# Patient Record
Sex: MALE | Race: BLACK OR AFRICAN AMERICAN | NOT HISPANIC OR LATINO | Employment: OTHER | URBAN - METROPOLITAN AREA
[De-identification: names, ages, dates, MRNs, and addresses within clinical notes are randomized per-mention and may not be internally consistent; named-entity substitution may affect disease eponyms.]

---

## 2017-03-31 ENCOUNTER — LAB VISIT (OUTPATIENT)
Dept: LAB | Facility: HOSPITAL | Age: 67
End: 2017-03-31
Attending: INTERNAL MEDICINE
Payer: COMMERCIAL

## 2017-03-31 ENCOUNTER — OFFICE VISIT (OUTPATIENT)
Dept: NEPHROLOGY | Facility: CLINIC | Age: 67
End: 2017-03-31
Payer: COMMERCIAL

## 2017-03-31 VITALS
HEIGHT: 71 IN | SYSTOLIC BLOOD PRESSURE: 120 MMHG | BODY MASS INDEX: 24.45 KG/M2 | WEIGHT: 174.63 LBS | DIASTOLIC BLOOD PRESSURE: 80 MMHG | HEART RATE: 73 BPM | OXYGEN SATURATION: 96 %

## 2017-03-31 DIAGNOSIS — N18.2 CHRONIC KIDNEY DISEASE, STAGE II (MILD): ICD-10-CM

## 2017-03-31 DIAGNOSIS — N18.2 CHRONIC KIDNEY DISEASE, STAGE II (MILD): Primary | ICD-10-CM

## 2017-03-31 LAB
BILIRUB UR QL STRIP: NEGATIVE
CLARITY UR REFRACT.AUTO: CLEAR
COLOR UR AUTO: NORMAL
CREAT UR-MCNC: 25 MG/DL
GLUCOSE UR QL STRIP: NEGATIVE
HGB UR QL STRIP: NEGATIVE
KETONES UR QL STRIP: NEGATIVE
LEUKOCYTE ESTERASE UR QL STRIP: NEGATIVE
NITRITE UR QL STRIP: NEGATIVE
PH UR STRIP: 6 [PH] (ref 5–8)
PROT UR QL STRIP: NEGATIVE
PROT UR-MCNC: <7 MG/DL
PROT/CREAT RATIO, UR: NORMAL
SP GR UR STRIP: 1 (ref 1–1.03)
URN SPEC COLLECT METH UR: NORMAL
UROBILINOGEN UR STRIP-ACNC: NEGATIVE EU/DL

## 2017-03-31 PROCEDURE — 1160F RVW MEDS BY RX/DR IN RCRD: CPT | Mod: S$GLB,,, | Performed by: INTERNAL MEDICINE

## 2017-03-31 PROCEDURE — 99999 PR PBB SHADOW E&M-EST. PATIENT-LVL III: CPT | Mod: PBBFAC,,, | Performed by: INTERNAL MEDICINE

## 2017-03-31 PROCEDURE — 1159F MED LIST DOCD IN RCRD: CPT | Mod: S$GLB,,, | Performed by: INTERNAL MEDICINE

## 2017-03-31 PROCEDURE — 1157F ADVNC CARE PLAN IN RCRD: CPT | Mod: S$GLB,,, | Performed by: INTERNAL MEDICINE

## 2017-03-31 PROCEDURE — 99213 OFFICE O/P EST LOW 20 MIN: CPT | Mod: S$GLB,,, | Performed by: INTERNAL MEDICINE

## 2017-03-31 PROCEDURE — 81003 URINALYSIS AUTO W/O SCOPE: CPT

## 2017-03-31 PROCEDURE — 82570 ASSAY OF URINE CREATININE: CPT

## 2017-03-31 PROCEDURE — 1126F AMNT PAIN NOTED NONE PRSNT: CPT | Mod: S$GLB,,, | Performed by: INTERNAL MEDICINE

## 2017-04-02 NOTE — PROGRESS NOTES
Subjective:       Patient ID: Hung Sorto is a 66 y.o. Black or  male who presents for follow-up evaluation of Chronic Kidney Disease    HPI    Mr. Sorto is a 66 year old man with past medical history of chronic kidney disease presenting for follow up of kidney function.  Patient currently without complaints, denies any fever, shortness of breath, abdominal pain, dysuria/hematuria. He reports blood pressure usually 130's systolic.    Review of Systems   Constitutional: Negative for appetite change, fatigue and fever.   Respiratory: Negative for cough and shortness of breath.    Cardiovascular: Negative for chest pain and leg swelling.   Gastrointestinal: Negative for abdominal pain, constipation, diarrhea, nausea and vomiting.   Genitourinary: Negative for dysuria, flank pain, frequency, hematuria and urgency.   Musculoskeletal: Negative for arthralgias, back pain and joint swelling.   Skin: Negative for rash.   Neurological: Negative for dizziness and light-headedness.   All other systems reviewed and are negative.      Objective:      Physical Exam   Constitutional: He appears well-developed and well-nourished.   Cardiovascular: Normal rate and regular rhythm.  Exam reveals no gallop and no friction rub.    No murmur heard.  Pulmonary/Chest: Effort normal and breath sounds normal. No respiratory distress. He has no wheezes. He has no rales.   Musculoskeletal: He exhibits no edema.   Neurological: He is alert.   Skin: Skin is warm and dry. No rash noted.   Vitals reviewed.      Assessment:       1. Chronic kidney disease, stage II (mild)        Plan:       Mr. Sorto is a 66 year old man with past medical history of chronic kidney disease, stage II/III presenting for follow up of kidney function.  Creatinine relatively stable, will continue to trend. Stressed importance of blood pressure control to prevent any progression of kidney disease, patient voiced understanding.   - Hypercalcemia:  previously resolved, patient followed by endocrine    Return to clinic 1 year with renal/heme panel, urinalysis/culture, urine protein/creatinine ratio prior to next visit

## 2017-06-09 ENCOUNTER — HOSPITAL ENCOUNTER (OUTPATIENT)
Dept: RADIOLOGY | Facility: HOSPITAL | Age: 67
Discharge: HOME | End: 2017-06-09
Attending: INTERNAL MEDICINE
Payer: COMMERCIAL

## 2017-06-09 ENCOUNTER — OFFICE VISIT (OUTPATIENT)
Dept: INTERNAL MEDICINE | Facility: CLINIC | Age: 67
End: 2017-06-09
Payer: COMMERCIAL

## 2017-06-09 ENCOUNTER — HOSPITAL ENCOUNTER (OUTPATIENT)
Dept: CARDIOLOGY | Facility: CLINIC | Age: 67
Discharge: HOME | End: 2017-06-09
Payer: COMMERCIAL

## 2017-06-09 VITALS
SYSTOLIC BLOOD PRESSURE: 147 MMHG | DIASTOLIC BLOOD PRESSURE: 86 MMHG | HEART RATE: 70 BPM | HEIGHT: 71 IN | OXYGEN SATURATION: 97 %

## 2017-06-09 DIAGNOSIS — R06.09 DOE (DYSPNEA ON EXERTION): ICD-10-CM

## 2017-06-09 DIAGNOSIS — E11.22 TYPE 2 DIABETES MELLITUS WITH STAGE 3 CHRONIC KIDNEY DISEASE, WITHOUT LONG-TERM CURRENT USE OF INSULIN: ICD-10-CM

## 2017-06-09 DIAGNOSIS — N18.9 CHRONIC KIDNEY DISEASE, UNSPECIFIED STAGE: Primary | ICD-10-CM

## 2017-06-09 DIAGNOSIS — Z72.0 TOBACCO ABUSE: ICD-10-CM

## 2017-06-09 DIAGNOSIS — N18.30 TYPE 2 DIABETES MELLITUS WITH STAGE 3 CHRONIC KIDNEY DISEASE, WITHOUT LONG-TERM CURRENT USE OF INSULIN: ICD-10-CM

## 2017-06-09 DIAGNOSIS — E04.2 MULTINODULAR NON-TOXIC GOITER: ICD-10-CM

## 2017-06-09 LAB
DIASTOLIC DYSFUNCTION: NO
RETIRED EF AND QEF - SEE NOTES: 60 (ref 55–65)

## 2017-06-09 PROCEDURE — 93010 ELECTROCARDIOGRAM REPORT: CPT | Mod: S$GLB,,, | Performed by: INTERNAL MEDICINE

## 2017-06-09 PROCEDURE — 93351 STRESS TTE COMPLETE: CPT | Mod: S$GLB,,, | Performed by: INTERNAL MEDICINE

## 2017-06-09 PROCEDURE — 71020 XR CHEST PA AND LATERAL: CPT | Mod: TC

## 2017-06-09 PROCEDURE — 99214 OFFICE O/P EST MOD 30 MIN: CPT | Mod: S$GLB,,, | Performed by: INTERNAL MEDICINE

## 2017-06-09 PROCEDURE — 3066F NEPHROPATHY DOC TX: CPT | Mod: S$GLB,,, | Performed by: INTERNAL MEDICINE

## 2017-06-09 PROCEDURE — 1159F MED LIST DOCD IN RCRD: CPT | Mod: S$GLB,,, | Performed by: INTERNAL MEDICINE

## 2017-06-09 PROCEDURE — 93321 DOPPLER ECHO F-UP/LMTD STD: CPT | Mod: S$GLB,,, | Performed by: INTERNAL MEDICINE

## 2017-06-09 PROCEDURE — 93005 ELECTROCARDIOGRAM TRACING: CPT | Mod: S$GLB,,, | Performed by: INTERNAL MEDICINE

## 2017-06-09 PROCEDURE — 3044F HG A1C LEVEL LT 7.0%: CPT | Mod: S$GLB,,, | Performed by: INTERNAL MEDICINE

## 2017-06-09 PROCEDURE — 1126F AMNT PAIN NOTED NONE PRSNT: CPT | Mod: S$GLB,,, | Performed by: INTERNAL MEDICINE

## 2017-06-09 PROCEDURE — 71020 XR CHEST PA AND LATERAL: CPT | Mod: 26,,, | Performed by: RADIOLOGY

## 2017-06-09 PROCEDURE — 99999 PR PBB SHADOW E&M-EST. PATIENT-LVL III: CPT | Mod: PBBFAC,,, | Performed by: INTERNAL MEDICINE

## 2017-06-11 ENCOUNTER — TELEPHONE (OUTPATIENT)
Dept: INTERNAL MEDICINE | Facility: CLINIC | Age: 67
End: 2017-06-11

## 2017-06-13 ENCOUNTER — TELEPHONE (OUTPATIENT)
Dept: INTERNAL MEDICINE | Facility: CLINIC | Age: 67
End: 2017-06-13

## 2017-06-13 NOTE — TELEPHONE ENCOUNTER
----- Message from Luda Jung sent at 6/13/2017  3:52 PM CDT -----  Contact: Pt can be reached at   Pt is calling to request test results for echo stress, and xray results. This is pt second request for results.    Thank you!

## 2017-06-14 ENCOUNTER — TELEPHONE (OUTPATIENT)
Dept: ALLERGY | Facility: CLINIC | Age: 67
End: 2017-06-14

## 2017-06-14 NOTE — TELEPHONE ENCOUNTER
----- Message from Justa Agosto sent at 6/14/2017  9:54 AM CDT -----  Contact: Self  Pt is calling requesting the results of his test.  Pt says he's called three times and no one has contacted him yet. Pt says he is still having a problem.    He can be reached at [phone].    Thank you.

## 2017-06-14 NOTE — TELEPHONE ENCOUNTER
Contacted patient and notified him Dr. Gregory will release his results when he returns to clinic....    LC

## 2017-06-19 ENCOUNTER — TELEPHONE (OUTPATIENT)
Dept: INTERNAL MEDICINE | Facility: CLINIC | Age: 67
End: 2017-06-19

## 2017-06-19 NOTE — TELEPHONE ENCOUNTER
----- Message from Deneen Bajwa sent at 6/19/2017  9:24 AM CDT -----  Contact: Self/[phone] cell/[phone] home   Patient would like to get test results.  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 Please call and advise          Thank you

## 2017-06-21 ENCOUNTER — TELEPHONE (OUTPATIENT)
Dept: INTERNAL MEDICINE | Facility: CLINIC | Age: 67
End: 2017-06-21

## 2017-06-21 DIAGNOSIS — R06.09 DOE (DYSPNEA ON EXERTION): Primary | ICD-10-CM

## 2017-06-21 NOTE — TELEPHONE ENCOUNTER
----- Message from Brayan Obando sent at 6/20/2017  1:57 PM CDT -----  Contact: self [phone]  Patient stated he understand is test came back normal , but he is still having breathing problems , request a call back from the office to see next step to take . Please advise , Thanks  !

## 2017-06-22 NOTE — PROGRESS NOTES
HISTORY OF PRESENT ILLNESS:  He is a 67-year-old gentleman coming in for some   chest pain and tightness and shortness of breath.  He had this yesterday   afternoon, it was in low   .  He was working on riding a .  He did   not have any chest pain, just a little grasping sensation in his chest.  He went   home and rested, it resolved.  He is having no shortness of breath or chest   pain now.  He denies any nausea or vomiting associated with it.  No PND or   orthopnea.  It was not extremely hot outside when he had this and he is not   having any other complaints today.  He has a history of insulin resistance,   hypertension and chronic kidney disease.    PHYSICAL EXAMINATION:  GENERAL:  He is a well-appearing 67-year-old -American gentleman in no   acute distress.  NECK:  Supple.  He has no JVD.  Thyroid is not enlarged.  CARDIOVASCULAR:  S1 and S2, regular rate and rhythm without murmur, gallop or   rub.  ABDOMEN:  Soft, nontender, no hepatosplenomegaly, no guarding or rebound   tenderness.  EXTREMITIES:  Lower extremities:  No edema.    I cannot reproduce the chest pain today.  His pulse ox is of 97% on room air.    EKG showed just bradycardia with a rate of 57.  Otherwise, no significant   changes from his previous EKG in January 1999.  We got labs and only had   negative troponin.  Hemoglobin A1c is 6.6, but he has diabetes that is diet   controlled.  He has CKD III with a creatinine clearance of 54 with a creatinine   of 1.5.    ASSESSMENT:  Shortness of breath with little chest tightness, suspicious for   angina.  We are going to get a stress echocardiogram.  We also got chemistry,   CBC, troponin.  He got EKG in room today and we will get a stress echo. If he   has worsening chest pain or worsens, he is going let us know and come in to the   Emergency Room.  I will follow him up as already scheduled.      SHRUTHI  dd: 06/21/2017 22:02:34 (CDT)  td: 06/22/2017 11:58:37 (CDT)  Doc ID   #[id number]   Job ID #[id number]    CC:

## 2017-06-23 ENCOUNTER — OFFICE VISIT (OUTPATIENT)
Dept: CARDIOLOGY | Facility: CLINIC | Age: 67
End: 2017-06-23
Payer: COMMERCIAL

## 2017-06-23 VITALS
OXYGEN SATURATION: 98 % | HEIGHT: 71 IN | SYSTOLIC BLOOD PRESSURE: 168 MMHG | DIASTOLIC BLOOD PRESSURE: 83 MMHG | WEIGHT: 168.63 LBS | BODY MASS INDEX: 23.61 KG/M2 | HEART RATE: 58 BPM

## 2017-06-23 DIAGNOSIS — R03.0 ELEVATED BLOOD PRESSURE READING IN OFFICE WITH WHITE COAT SYNDROME, WITHOUT DIAGNOSIS OF HYPERTENSION: ICD-10-CM

## 2017-06-23 DIAGNOSIS — R06.02 SOB (SHORTNESS OF BREATH): Primary | ICD-10-CM

## 2017-06-23 DIAGNOSIS — N18.30 STAGE 3 CHRONIC KIDNEY DISEASE: ICD-10-CM

## 2017-06-23 PROCEDURE — 1126F AMNT PAIN NOTED NONE PRSNT: CPT | Mod: S$GLB,,, | Performed by: INTERNAL MEDICINE

## 2017-06-23 PROCEDURE — 99203 OFFICE O/P NEW LOW 30 MIN: CPT | Mod: S$GLB,,, | Performed by: INTERNAL MEDICINE

## 2017-06-23 PROCEDURE — 1159F MED LIST DOCD IN RCRD: CPT | Mod: S$GLB,,, | Performed by: INTERNAL MEDICINE

## 2017-06-23 PROCEDURE — 99999 PR PBB SHADOW E&M-EST. PATIENT-LVL III: CPT | Mod: PBBFAC,,, | Performed by: INTERNAL MEDICINE

## 2017-06-23 RX ORDER — ACETAMINOPHEN 500 MG
5000 TABLET ORAL DAILY
COMMUNITY

## 2017-06-23 RX ORDER — ALBUTEROL SULFATE 90 UG/1
2 AEROSOL, METERED RESPIRATORY (INHALATION) EVERY 6 HOURS PRN
Qty: 18 G | Refills: 1 | Status: SHIPPED | OUTPATIENT
Start: 2017-06-23 | End: 2017-08-14 | Stop reason: SDUPTHER

## 2017-06-23 NOTE — LETTER
June 23, 2017      Tacos Gregory Jr., MD  [address]  [place] [address]           [facility]iology  [address]  [place] [address]  Phone: [phone]          Patient: Hung Sorto   MR Number: [id number]   YOB: 1950   Date of Visit: 6/23/2017       Dear Dr. Tacos Gregory Jr.:    Thank you for referring Hung Sorto to me for evaluation. Attached you will find relevant portions of my assessment and plan of care.    If you have questions, please do not hesitate to call me. I look forward to following Hung Sorto along with you.    Sincerely,    Abdulaziz Weller MD    Enclosure  CC:  No Recipients    If you would like to receive this communication electronically, please contact [email or web address] or [phone] to request more information on [employer] Link access.    For providers and/or their staff who would like to refer a patient to [facility], please contact us through our one-stop-shop provider referral line, [facility] , at [phone].    If you feel you have received this communication in error or would no longer like to receive these types of communications, please e-mail [email or web address]

## 2017-06-23 NOTE — PROGRESS NOTES
"Subjective:   Patient ID:  Hung Sorto is a 67 y.o. male who presents for follow-up of Shortness of Breath (with exertion x 2 weeks)    HPI:   Pt is here for SOB.  He has a h/o tobacco abuse, prediabetes and  stage 3 CKD. Recent exercise stress echo was negative for ischemia at 12 METS and 90% MPHR.  Normal diastolic function. ECG is normal.  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 He states his SOB usually occurs while cutting the grass (he has both rider mower and also walks).  He had a similar episode last year and also noted wheezing.  He was prescribed albuterol. He doesn't have any left at this time but he believes the inhaler relieved his sxs.  He denies wheezing over the past month but states that last year when his sxs occurred, he was wheezing.   He denies any exertional chest discomfort, exertional dyspnea, palpitations, TIA's, syncope or presyncope.    Elevated BP in office today however, this was his highest reading on the chart.  Most reading between 120-145/65-80.    Vitals:    06/23/17 0845   BP: (!) 168/83   BP Location: Left arm   Patient Position: Sitting   BP Method: Automatic   Pulse: (!) 58   SpO2: 98%   Weight: 76.5 kg (168 lb 10.4 oz)   Height: 5' 11" (1.803 m)     Body mass index is 23.52 kg/m².  CrCl cannot be calculated (Patient's most recent sCr result is older than the maximum 14 days allowed.).    Lab Results   Component Value Date     06/09/2017    K 4.3 06/09/2017     06/09/2017    CO2 26 06/09/2017    BUN 15 06/09/2017    CREATININE 1.5 (H) 06/09/2017     (H) 06/09/2017    HGBA1C 6.6 (H) 06/09/2017    MG 2.1 07/21/2008    AST 35 06/09/2017    ALT 38 06/09/2017    ALBUMIN 4.0 06/09/2017    PROT 7.7 06/09/2017    BILITOT 1.5 (H) 06/09/2017    WBC 8.01 06/09/2017    HGB 15.3 06/09/2017    HCT 46.1 06/09/2017    MCV 87 06/09/2017     06/09/2017    PSA 0.41 06/25/2012    TSH 1.174 02/25/2014    CHOL 145 05/28/2015    HDL 44 05/28/2015 "    LDLCALC 86.4 05/28/2015    TRIG 73 05/28/2015       Current Outpatient Prescriptions   Medication Sig    ALPHAGAN P 0.1 % Drop 1 drop 3 (three) times daily.     aspirin (ECOTRIN) 81 MG EC tablet Take 81 mg by mouth once daily.      atorvastatin (LIPITOR) 20 MG tablet TAKE 1 TABLET DAILY    cholecalciferol, vitamin D3, (VITAMIN D3) 5,000 unit Tab Take 5,000 Units by mouth once daily.    dorzolamide (TRUSOPT) 2 % ophthalmic solution Place 1 drop into both eyes 3 (three) times daily.     latanoprost 0.005 % ophthalmic solution Place into both eyes once daily.     albuterol 90 mcg/actuation inhaler Inhale 2 puffs into the lungs every 6 (six) hours as needed for Wheezing.     Current Facility-Administered Medications   Medication    albuterol sulfate nebulizer solution 2.5 mg       Review of Systems   Constitution: Negative for decreased appetite, weakness, malaise/fatigue, weight gain and weight loss.   HENT: Negative for headaches.    Eyes: Negative for visual disturbance.   Cardiovascular: Positive for dyspnea on exertion. Negative for chest pain, claudication, irregular heartbeat, orthopnea, palpitations, paroxysmal nocturnal dyspnea and syncope.   Respiratory: Negative for cough, shortness of breath and snoring.    Skin: Negative for rash.   Musculoskeletal: Negative for arthritis, muscle cramps, muscle weakness and myalgias.   Gastrointestinal: Negative for abdominal pain, anorexia, change in bowel habit and nausea.   Genitourinary: Negative for dysuria and frequency.   Neurological: Negative for excessive daytime sleepiness, dizziness, loss of balance and numbness.   Psychiatric/Behavioral: Negative for depression.       Objective:   Physical Exam   Constitutional: He is oriented to person, place, and time. He appears well-developed and well-nourished.   HENT:   Head: Normocephalic and atraumatic.   Eyes: Pupils are equal, round, and reactive to light.   Neck: Normal range of motion. Neck supple.    Cardiovascular: Normal rate, regular rhythm, normal heart sounds, intact distal pulses and normal pulses.  Exam reveals no gallop.    No murmur heard.  Pulmonary/Chest: Effort normal and breath sounds normal.   Abdominal: Soft. Bowel sounds are normal. There is no hepatosplenomegaly. There is no tenderness.   Musculoskeletal: Normal range of motion.   Neurological: He is alert and oriented to person, place, and time.   Skin: Skin is warm and dry.   Psychiatric: He has a normal mood and affect. His speech is normal and behavior is normal. Judgment and thought content normal.     Assessment:     1. SOB (shortness of breath)    2. Stage 3 chronic kidney disease    3. Elevated blood pressure reading in office with white coat syndrome, without diagnosis of hypertension        Plan:   SOB is likely reactive airway disease secondary to environment (grass).  Refill albuterol and refer to pulmonary for further workup given his h/o tobacco use.

## 2017-08-14 ENCOUNTER — HOSPITAL ENCOUNTER (OUTPATIENT)
Dept: PULMONOLOGY | Facility: CLINIC | Age: 67
Discharge: HOME | End: 2017-08-14
Payer: COMMERCIAL

## 2017-08-14 ENCOUNTER — OFFICE VISIT (OUTPATIENT)
Dept: PULMONOLOGY | Facility: CLINIC | Age: 67
End: 2017-08-14
Payer: COMMERCIAL

## 2017-08-14 VITALS
BODY MASS INDEX: 23.67 KG/M2 | HEIGHT: 71 IN | DIASTOLIC BLOOD PRESSURE: 84 MMHG | WEIGHT: 169.06 LBS | SYSTOLIC BLOOD PRESSURE: 132 MMHG | OXYGEN SATURATION: 98 % | HEART RATE: 62 BPM

## 2017-08-14 DIAGNOSIS — R06.02 SHORTNESS OF BREATH: ICD-10-CM

## 2017-08-14 DIAGNOSIS — J44.9 STAGE 1 MILD CHRONIC OBSTRUCTIVE PULMONARY DISEASE BY GLOBAL INITIATIVE FOR CHRONIC OBSTRUCTIVE LUNG DISEASE CLASSIFICATION: ICD-10-CM

## 2017-08-14 DIAGNOSIS — R06.02 SHORTNESS OF BREATH: Primary | ICD-10-CM

## 2017-08-14 DIAGNOSIS — R06.02 SOB (SHORTNESS OF BREATH): ICD-10-CM

## 2017-08-14 LAB
POST FEV1 FVC: 0.68
POST FEV1: 2.77
POST FVC: 4.07
PRE FEV1 FVC: 68
PRE FEV1: 2.74
PRE FVC: 4.04
PREDICTED FEV1 FVC: 78
PREDICTED FEV1: 3.51
PREDICTED FVC: 4.4

## 2017-08-14 PROCEDURE — 99999 PR PBB SHADOW E&M-EST. PATIENT-LVL III: CPT | Mod: PBBFAC,,, | Performed by: INTERNAL MEDICINE

## 2017-08-14 PROCEDURE — 99243 OFF/OP CNSLTJ NEW/EST LOW 30: CPT | Mod: 25,S$GLB,, | Performed by: INTERNAL MEDICINE

## 2017-08-14 PROCEDURE — 94729 DIFFUSING CAPACITY: CPT | Mod: S$GLB,,, | Performed by: INTERNAL MEDICINE

## 2017-08-14 PROCEDURE — 94727 GAS DIL/WSHOT DETER LNG VOL: CPT | Mod: 59,S$GLB,, | Performed by: INTERNAL MEDICINE

## 2017-08-14 PROCEDURE — 94060 EVALUATION OF WHEEZING: CPT | Mod: S$GLB,,, | Performed by: INTERNAL MEDICINE

## 2017-08-14 RX ORDER — TIOTROPIUM BROMIDE 18 UG/1
18 CAPSULE ORAL; RESPIRATORY (INHALATION) DAILY
Qty: 30 CAPSULE | Refills: 1 | Status: SHIPPED | OUTPATIENT
Start: 2017-08-14 | End: 2017-10-09 | Stop reason: SINTOL

## 2017-08-14 RX ORDER — ALBUTEROL SULFATE 90 UG/1
2 AEROSOL, METERED RESPIRATORY (INHALATION) EVERY 6 HOURS PRN
Qty: 18 G | Refills: 1 | Status: SHIPPED | OUTPATIENT
Start: 2017-08-14 | End: 2021-12-13 | Stop reason: SDUPTHER

## 2017-08-14 NOTE — LETTER
August 16, 2017      Abdulaziz Weller MD  [address]  [place] [address]           [facility] Pulmonary [facility]  [address]  [place] [address]  Phone: [phone]          Patient: Hung Sorto   MR Number: [id number]   YOB: 1950   Date of Visit: 8/14/2017       Dear Dr. Abdulaziz Weller:    Thank you for referring Hung Sorto to me for evaluation. Attached you will find relevant portions of my assessment and plan of care.    If you have questions, please do not hesitate to call me. I look forward to following Hung Sorto along with you.    Sincerely,    Melvin Sanders MD    Enclosure  CC:  No Recipients    If you would like to receive this communication electronically, please contact [email or web address] or [phone] to request more information on [employer] Link access.    For providers and/or their staff who would like to refer a patient to [facility], please contact us through our one-stop-shop provider referral line, [facility], at [phone].    If you feel you have received this communication in error or would no longer like to receive these types of communications, please e-mail [email or web address]

## 2017-08-14 NOTE — PROGRESS NOTES
Subjective:       Patient ID: Hung Sorto is a 67 y.o. male.    Chief Complaint: SOB    Mr. Sorto is a 66 y/o gentleman being seen upon referral from PCP to evaluate SOB. He originally had an episode of SOB in May 2017, similar to an episode he had last year where he was treated in the office with an albuterol nebulizer and was sent home with an albuterol inhaler, which he only used once.    In May this year, he had one episode of SOB while mowing the lawn. He had associated wheezing, dry cough, and mild chest tightness. No chest pain. Since then he has had recurrent episodes about every other day- all while cutting grass using a push mower, and it requires him to stop to catch his breath. His symptoms are worse with higher humidity. He has not had symptoms at rest, but his wife thinks he has had wheezing at night. He has used the albuterol inhaler most mornings before going to work, with some improvement in symptoms. He has previously been evaluated by cardiology with a normal exercise stress echo, and they feel his SOB is noncardiac in origin. In the past week he has had sinus pressure on the left with left nasal discharge and hoarseness.    Mr. Sorto quit smoking cigarettes 3 months ago. Previously he had 50 yrs x <1ppd. There is no family history of lung disease. Prior to last year's episode, his only previous lung issue was SOB with an unknown glaucoma medication that was promptly stopped by his ophthalmologist. He does yard work since [employer], but previously worked for [employer] as an  for >40 years.      Past Medical History:   Diagnosis Date    Chronic kidney disease     Colon polyp     removed by colonoscopy    Glaucoma (increased eye pressure)     Goiter     MNG    Insulin resistance     Thyroid disease     Tobacco abuse     Vitamin D deficiency disease      Past Surgical History:   Procedure Laterality Date    COLONOSCOPY N/A 10/28/2016    Procedure: COLONOSCOPY;   Surgeon: Melvin Berg MD;  Location: [facility] ([address] [place] [address]);  Service: Endoscopy;  Laterality: N/A;    COLONOSCOPY W/ BIOPSIES AND POLYPECTOMY       Family History   Problem Relation Age of Onset    Diabetes Mother     Alzheimer's disease Mother     Stroke Father     Hypertension Father      Social History     Social History    Marital status:      Spouse name: N/A    Number of children: N/A    Years of education: N/A     Occupational History    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 Negative for weakness.   HENT: Positive for sinus pressure, voice change and congestion.    Respiratory: Positive for cough, chest tightness, shortness of breath, wheezing and use of rescue inhaler. Negative for sputum production.    Cardiovascular: Negative for chest pain and leg swelling.   Genitourinary: Negative for difficulty urinating and hematuria.   Musculoskeletal: Negative for arthralgias and joint swelling.   Skin: Negative for rash.   Gastrointestinal: Negative for nausea, vomiting, abdominal pain and acid reflux.   Neurological: Negative for dizziness.       Objective:      Physical Exam   Constitutional: He appears well-developed and well-nourished. No distress.   HENT:   Head: Normocephalic.   Mouth/Throat: No oropharyngeal exudate.   Pharyngeal erythema, no purulent nasal discharge in nares.   Cardiovascular: Normal rate, regular rhythm and normal heart sounds.     Pulmonary/Chest: Normal expansion, symmetric chest wall expansion, effort normal and breath sounds normal. No stridor. No respiratory distress. He has no wheezes.   SpO2 98% at rest, dropped to 88% with moderate exertion, without dyspnea or tachypnea   Abdominal: Soft. He exhibits no distension. There is no tenderness.   Musculoskeletal: He exhibits no edema or deformity.   Neurological: He is alert. Gait normal.   Skin: Skin is warm and dry. No rash noted. He is not diaphoretic. No cyanosis. Nails show no clubbing.   Psychiatric: He has a normal mood and affect. His behavior is normal.   Vitals reviewed.    Personal Diagnostic Review  CXR 6/2017, 6/2016 reviewed: significant for flattened diaphragms, suggestive of hyperinflation.  PFTs demonstrating FEV1/FVC 68%, FEV1 2.74L (81% predicted), FVC 4.04L (94% predicted), TLC 6.86 (103% predicted), RV 2.82 (111% predicted), DLCO 19.9 (78% predicted).  Stress echo 6/2017: no ischemic changes, significant hypertensive response. No mention of RV dilation or strain.        Assessment & Plan:       Mr. Sorto is a 66 y/o man with a significant history of tobacco abuse and symptoms suggestive of reactive airway disease without previous diagnosis or treatment. PFTs demonstrate mild obstruction. Exercise-associated desaturations likely due to his obstructive lung disease, which has previously been untreated.       1. Shortness of breath    2. SOB (shortness of breath)    3. Stage 1 mild chronic obstructive pulmonary disease by Global Initiative for Chronic Obstructive Lung Disease classification        -Follow up in clinic in 6 weeks with repeat spirometry and 6MWD testing.  -Starting Tiotropium daily and continue albuterol PRN for wheezing and dyspnea not improving with rest.  -Congratulated him on smoking cessation and encouraged him to continue to avoid cigarettes.  -Encouraged him to stay up to date on pneumonia and flu vaccinations and discussed the importance of avoiding  sick contacts.    Outpatient Encounter Prescriptions as of 8/14/2017   Medication Sig Dispense Refill    albuterol 90 mcg/actuation inhaler Inhale 2 puffs into the lungs every 6 (six) hours as needed for Wheezing. 18 g 1    ALPHAGAN P 0.1 % Drop 1 drop 3 (three) times daily.       aspirin (ECOTRIN) 81 MG EC tablet Take 81 mg by mouth once daily.        atorvastatin (LIPITOR) 20 MG tablet TAKE 1 TABLET DAILY 90 tablet 10    cholecalciferol, vitamin D3, (VITAMIN D3) 5,000 unit Tab Take 5,000 Units by mouth once daily.      dorzolamide (TRUSOPT) 2 % ophthalmic solution Place 1 drop into both eyes 3 (three) times daily.       latanoprost 0.005 % ophthalmic solution Place into both eyes once daily.        Facility-Administered Encounter Medications as of 8/14/2017   Medication Dose Route Frequency Provider Last Rate Last Dose    albuterol sulfate nebulizer solution 2.5 mg  2.5 mg Nebulization 1 time in Clinic/HOD Yuly Justin MD         Orders Placed This Encounter   Procedures    Spirometry with/without bronchodilator     Standing Status:   Future     Number of Occurrences:   1     Standing Expiration Date:   8/14/2018    LUNG VOLUMES     Standing Status:   Future     Number of Occurrences:   1     Standing Expiration Date:   8/14/2018    DLCO-Carbon Monoxide Diffusing Capacity     Standing Status:   Future     Number of Occurrences:   1     Standing Expiration Date:   8/14/2018     Assessment & plan discussed with Dr. Sanders.    Maribel Moser 08/14/2017 6:23 PM  [facility] Fellow

## 2017-08-16 PROBLEM — J44.9: Status: ACTIVE | Noted: 2017-08-16

## 2017-10-02 RX ORDER — ATORVASTATIN CALCIUM 20 MG/1
TABLET, FILM COATED ORAL
Qty: 90 TABLET | Refills: 10 | Status: SHIPPED | OUTPATIENT
Start: 2017-10-02 | End: 2019-02-04 | Stop reason: SDUPTHER

## 2017-10-09 ENCOUNTER — OFFICE VISIT (OUTPATIENT)
Dept: PULMONOLOGY | Facility: CLINIC | Age: 67
End: 2017-10-09
Payer: COMMERCIAL

## 2017-10-09 ENCOUNTER — HOSPITAL ENCOUNTER (OUTPATIENT)
Dept: PULMONOLOGY | Facility: CLINIC | Age: 67
Discharge: HOME | End: 2017-10-09
Payer: COMMERCIAL

## 2017-10-09 VITALS — HEIGHT: 70 IN | BODY MASS INDEX: 25.2 KG/M2 | WEIGHT: 176 LBS

## 2017-10-09 VITALS
HEART RATE: 58 BPM | WEIGHT: 176 LBS | DIASTOLIC BLOOD PRESSURE: 76 MMHG | OXYGEN SATURATION: 99 % | HEIGHT: 70 IN | BODY MASS INDEX: 25.2 KG/M2 | SYSTOLIC BLOOD PRESSURE: 138 MMHG

## 2017-10-09 DIAGNOSIS — R06.02 SHORTNESS OF BREATH: ICD-10-CM

## 2017-10-09 DIAGNOSIS — J44.9 STAGE 2 MODERATE COPD BY GOLD CLASSIFICATION: Primary | ICD-10-CM

## 2017-10-09 LAB
PRE FEV1 FVC: 68
PRE FEV1: 2.57
PRE FVC: 3.76
PREDICTED FEV1 FVC: 79
PREDICTED FEV1: 3.34
PREDICTED FVC: 4.18

## 2017-10-09 PROCEDURE — 99999 PR PBB SHADOW E&M-EST. PATIENT-LVL III: CPT | Mod: PBBFAC,,, | Performed by: INTERNAL MEDICINE

## 2017-10-09 PROCEDURE — 94620 PR PULMONARY STRESS TESTING,SIMPLE: CPT | Mod: S$GLB,,, | Performed by: INTERNAL MEDICINE

## 2017-10-09 PROCEDURE — 99213 OFFICE O/P EST LOW 20 MIN: CPT | Mod: S$GLB,,, | Performed by: INTERNAL MEDICINE

## 2017-10-09 PROCEDURE — 94010 BREATHING CAPACITY TEST: CPT | Mod: S$GLB,,, | Performed by: INTERNAL MEDICINE

## 2017-10-09 NOTE — PROCEDURES
Hung Sorto is a 67 y.o.  male patient, who presents for a 6 minute walk test ordered by Maribel Moser DO.  The diagnosis is COPD.  The patient's BMI is 25.3 kg/m2.  Predicted distance (lower limit of normal) is 373.15 meters.      Test Results:    The test was completed without stopping.  The total time walked was 360 seconds.  During walking, the patient reported:  No complaints.  The patient used no assistive devices during testing.     10/09/2017---------Distance: 365.76 meters (1200 feet)     O2 Sat % Supplemental Oxygen Heart Rate Blood Pressure Yousif Scale   Pre-exercise  (Resting) 98 % Room Air 57 bpm 143/74 mmHg 0   During Exercise 98 % Room Air 59 bpm 149/83 mmHg 0   Post-exercise  (Recovery) 98 % Room Air  58 bpm       Recovery Time:  109 seconds    Performing nurse/tech:  TASHA Brady,      PREVIOUS STUDY:   The patient has not had a previous study.      CLINICAL INTERPRETATION:  Six minute walk distance is 365.76 meters (1200 feet) with no dyspnea.  During exercise, there was no desaturation while breathing room air.  Both blood pressure and heart rate remained stable with walking.  Bradycardia was present prior to exercise.  The patient did not report non-pulmonary symptoms during exercise.  No previous study performed.  Based upon age and body mass index, exercise capacity is less than predicted.

## 2017-10-09 NOTE — PROGRESS NOTES
Subjective:       Patient ID: Hung Sorto is a 67 y.o. male.    Chief Complaint: 2-month follow- up    HPI:  Mr. Sorto is a 68 y/o gentleman here for 2-month follow- up of COPD. At the last visit he was prescribed albuterol and Spiriva. He tried Spiriva for 1 week, but noticed that he was more dyspneic and required more albuterol while on it and he developed hives, at which point he stopped taking it. Most mornings when he works, he takes 2 puffs of albuterol and has good symptom control all day. 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 He is overall feeling better overall since August- no wheezing or SOB. The days he is not planning on being as physically active, he does not use albuterol and does not need it during the day. He can still cut grass for 5 yards in one day, and his SOB is not severe. He has noticed he has had sinus symptoms and nasal congestion for the past 3-4 weeks and attributes this to seasonal allergies- fall & spring. He denies post-nasal drip. He thinks there could be some correlateion with more nasal symptoms with increased respiratory symptoms.     He received his flu shot today. UTD on pneumonia vaccines (2013, 2015).    Review of Systems   Constitutional: Negative for fever and weight loss.   HENT: Positive for rhinorrhea and voice change. Negative for postnasal drip, sore throat and trouble swallowing.    Respiratory: Negative for cough, sputum production, shortness of breath, wheezing and asthma nighttime symptoms.    Cardiovascular: Negative for chest pain.   Skin: Negative for rash.   Gastrointestinal: Negative for nausea, vomiting and acid reflux.       Objective:      Physical Exam   Constitutional: He is oriented to person, place, and time. He appears well-developed. He appears not cachectic. No distress. He is not obese.   HENT:   Head: Normocephalic.   Neck: Neck supple.   Cardiovascular: Normal rate, regular rhythm  and normal heart sounds.    Pulmonary/Chest: Normal expansion, effort normal and breath sounds normal. No stridor. He has no wheezes. He has no rhonchi.   Abdominal: He exhibits no distension.   Lymphadenopathy: No supraclavicular adenopathy is present.     He has no cervical adenopathy.   Neurological: He is alert and oriented to person, place, and time. Gait normal.   Skin: Skin is warm and dry. No rash noted.   Vitals reviewed.    Personal Diagnostic Review  PFTs 10/9: FEV1/FVC= 68%, FVC 3.76L (89% predicted), FEV1 2.57 (77% predicted)- essentially unchanged from August   6MWT 10/9: SpO2 remained 98% throughout without signigicant increase in HR or BP, ambulated 366 of the expected 555 meters for matched controls.    Pulmonary Function Tests 10/9/2017   Ordering Provider DO Ehsan.   Performing nurse/tech/RT TASHA Brady,   Diagnosis Exertional Dyspnea   Height 70   Weight 2816   BMI (Calculated) 25.3   Patient Race    6MWT Status completed without stopping   Patient Reported No complaints   Was O2 used? No   6MW Distance walked (feet) 1200   Distance walked (meters) 365.76   Did patient stop? No   Type of assistive device(s) used? no assistive devices   Oxygen Saturation 98   Supplemental Oxygen Room Air   Heart Rate 57   Blood Pressure 143/74   Yousif Dyspnea Rating  nothing at all   Oxygen Saturation 98   Supplemental Oxygen Room Air   Heart Rate 59   Blood Pressure 149/83   Yousif Dyspnea Rating  nothing at all   Recovery Time (seconds) 109   Oxygen Saturation 98   Supplemental Oxygen Room Air   Heart Rate 58   Is procedure ready for interpretation? Yes   Some recent data might be hidden         Assessment:       Mr. Sorto is a 66y/o gentleman with moderate COPD based on spirometry who is not significantly restricted by his symptoms. He has achieved fairly good symptom control with his IGOR and was intolerant to Spiriva due to medication side effect. He has historical features that suggest he may  have more of an asthma phenotype to his COPD.    1. Stage 2 moderate COPD by GOLD classification        Outpatient Encounter Prescriptions as of 10/9/2017   Medication Sig Dispense Refill    albuterol 90 mcg/actuation inhaler Inhale 2 puffs into the lungs every 6 (six) hours as needed for Wheezing. 18 g 1    ALPHAGAN P 0.1 % Drop 1 drop 3 (three) times daily.       aspirin (ECOTRIN) 81 MG EC tablet Take 81 mg by mouth once daily.        atorvastatin (LIPITOR) 20 MG tablet TAKE 1 TABLET DAILY 90 tablet 10    cholecalciferol, vitamin D3, (VITAMIN D3) 5,000 unit Tab Take 5,000 Units by mouth once daily.      dorzolamide (TRUSOPT) 2 % ophthalmic solution Place 1 drop into both eyes 3 (three) times daily.       latanoprost 0.005 % ophthalmic solution Place into both eyes once daily.       [DISCONTINUED] tiotropium (SPIRIVA WITH HANDIHALER) 18 mcg inhalation capsule Inhale 1 capsule (18 mcg total) into the lungs once daily. Controller 30 capsule 1     Facility-Administered Encounter Medications as of 10/9/2017   Medication Dose Route Frequency Provider Last Rate Last Dose    albuterol sulfate nebulizer solution 2.5 mg  2.5 mg Nebulization 1 time in Clinic/HOD Yuly Justin MD         Orders Placed This Encounter   Procedures    Spirometry without Bronchodilator     Standing Status:   Future     Standing Expiration Date:   10/9/2018     Plan:       Moderate COPD by GOLD; appears to possibly have an asthma phenotype, although no significant bronchodilator response on 8/2017 PFTs.  -Con't IGOR daily prior to activity plus PRN. If symptoms increase or he begins to require the IGOR more frequently, restarting a longer-acting controlling medication would be needed. He will let us know if this occurs. Favor starting LABA-ICS combination at a low-dose if he requires step-up therapy.   -Follow up in 6 months with repeat spirometry or PRN for symptom progression.  -UTD on vaccinations    Former tobacco  abuse  -congratulated him again on quitting smoking last spring    Seen & discussed with Dr. Sanders.    Maribel Moser 10/09/2017 8:21 PM  [employer] [facility] Fellow

## 2018-02-27 ENCOUNTER — TELEPHONE (OUTPATIENT)
Dept: NEPHROLOGY | Facility: CLINIC | Age: 68
End: 2018-02-27

## 2018-02-27 NOTE — TELEPHONE ENCOUNTER
----- Message from Beata Jolanta sent at 2/26/2018  1:48 PM CST -----  Contact: Hung tel:   [phone]  or   [phone] cell   Pt.says he needs an early morning on a Monday for a recall appt. With you.    Needs a March.      Pls call.     Called l/m

## 2018-04-02 ENCOUNTER — HOSPITAL ENCOUNTER (OUTPATIENT)
Dept: PULMONOLOGY | Facility: CLINIC | Age: 68
Discharge: HOME | End: 2018-04-02
Payer: MEDICARE

## 2018-04-02 ENCOUNTER — OFFICE VISIT (OUTPATIENT)
Dept: PULMONOLOGY | Facility: CLINIC | Age: 68
End: 2018-04-02
Payer: MEDICARE

## 2018-04-02 VITALS — HEART RATE: 68 BPM | BODY MASS INDEX: 26.63 KG/M2 | WEIGHT: 186 LBS | OXYGEN SATURATION: 97 % | HEIGHT: 70 IN

## 2018-04-02 DIAGNOSIS — J44.9 STAGE 2 MODERATE COPD BY GOLD CLASSIFICATION: Primary | ICD-10-CM

## 2018-04-02 DIAGNOSIS — J44.9 STAGE 2 MODERATE COPD BY GOLD CLASSIFICATION: ICD-10-CM

## 2018-04-02 LAB
PRE FEV1 FVC: 70
PRE FEV1: 2.55
PRE FVC: 3.66
PREDICTED FEV1 FVC: 79
PREDICTED FEV1: 3.34
PREDICTED FVC: 4.18

## 2018-04-02 PROCEDURE — 94010 BREATHING CAPACITY TEST: CPT | Mod: 26,S$PBB,, | Performed by: INTERNAL MEDICINE

## 2018-04-02 PROCEDURE — 99213 OFFICE O/P EST LOW 20 MIN: CPT | Mod: 25,S$PBB,, | Performed by: INTERNAL MEDICINE

## 2018-04-02 PROCEDURE — 94010 BREATHING CAPACITY TEST: CPT | Mod: PBBFAC | Performed by: INTERNAL MEDICINE

## 2018-04-02 PROCEDURE — 99213 OFFICE O/P EST LOW 20 MIN: CPT | Mod: PBBFAC,25 | Performed by: INTERNAL MEDICINE

## 2018-04-02 PROCEDURE — 99999 PR PBB SHADOW E&M-EST. PATIENT-LVL III: CPT | Mod: PBBFAC,,, | Performed by: INTERNAL MEDICINE

## 2018-04-02 NOTE — PROGRESS NOTES
"Subjective:       Patient ID: Hung Sorto is a 67 y.o. male.    Chief Complaint: COPD and Shortness of Breath    HPI     Mr. Sorto returns for interval assessment of COPD secondary to longstanding smoking.  He has ~50 pack-year smoking history until stopping in May 2017.  Since his last Pulmonary visit in October 2017, he has done very well from respiratory standpoint.  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  He denies significant cough, sputum production, or shortness of breath.  He reports occasional runny nose when there is lots of pollen in the air.  He has been compliant with preventive immunizations and denies having the flu this past winter despite the worse than usual season.    Review of Systems   Constitutional: Negative for activity change, appetite change, fatigue and weakness.   HENT: Positive for rhinorrhea. Negative for postnasal drip and sore throat.    Respiratory: Positive for use of rescue inhaler. Negative for cough, hemoptysis, sputum production, chest tightness, shortness of breath, wheezing, asthma nighttime symptoms and dyspnea on extertion.    Cardiovascular: Negative for chest pain.       Objective:      Physical Exam   Constitutional: He is oriented to person, place, and time. He appears well-nourished. No distress.   HENT:   Right Ear: External ear normal.   Left Ear: External ear normal.   Nose: Nose normal. No mucosal edema. No polyps.   Mouth/Throat: Oropharynx is clear and moist. Normal dentition. No oropharyngeal exudate.   Neck: No JVD present. No thyromegaly present.   Cardiovascular: Normal rate, regular rhythm and normal heart sounds.  Exam reveals no gallop.    No murmur heard.  Pulmonary/Chest: Normal expansion, symmetric chest wall expansion, effort normal and breath sounds normal. No respiratory " distress. He has no decreased breath sounds. He has no wheezes. He has no rhonchi. He has no rales.   Musculoskeletal: He exhibits no edema.   Lymphadenopathy: No supraclavicular adenopathy is present.     He has no cervical adenopathy.   Neurological: He is alert and oriented to person, place, and time. Gait normal.   Skin: Nails show no clubbing.   Psychiatric: He has a normal mood and affect. Judgment normal.   Nursing note and vitals reviewed.    Personal Diagnostic Review    PFTs 08/14/2017 10/09/2017 04/02/2018   FVC  (pre-BD) 4.04 liters 3.76 liters 3.66 liters   FVC%  92 90 87   FEV1 (pre-BD) 2.74 liters 2.57 liters 2.55 liters   FEV1%  78 77 76   FEV1/FVC  68 68 70   FEF 25-75  1.49 1.41 1.55   FEF 25-75%  43 43 47   FVC (post-BD) 4.07 liters     FVC% 93     FEV1 (post-BD) 2.77 liters     FEV1% 79     FEV1/FVC 68     FEF 25-75 1.62     FEF 25-75% 47     TLC  6.86 liters     TLC%  101     RV  2.82 liters     RV%  109     DLCO  19.90 mL/mmHg/min     DLCO%  78     VA 5.99 liters     6MWT  10/09/2017    Distance  366 meters    SpO2 (axel)  98 %    SpO2 (delta)  0 %      Pulmonary Function Tests 4/2/2018   SpO2 97   Height 70.000   Weight 2976   BMI (Calculated) 26.7   Some recent data might be hidden         Assessment:       1. Stage 2 moderate COPD by GOLD classification        Outpatient Encounter Prescriptions as of 4/2/2018   Medication Sig Dispense Refill    albuterol 90 mcg/actuation inhaler Inhale 2 puffs into the lungs every 6 (six) hours as needed for Wheezing. 18 g 1    ALPHAGAN P 0.1 % Drop 1 drop 3 (three) times daily.       aspirin (ECOTRIN) 81 MG EC tablet Take 81 mg by mouth once daily.        atorvastatin (LIPITOR) 20 MG tablet TAKE 1 TABLET DAILY 90 tablet 10    cholecalciferol, vitamin D3, (VITAMIN D3) 5,000 unit Tab Take 5,000 Units by mouth once daily.      dorzolamide (TRUSOPT) 2 % ophthalmic solution Place 1 drop into both eyes 3 (three) times daily.       latanoprost 0.005 %  ophthalmic solution Place into both eyes once daily.        Facility-Administered Encounter Medications as of 4/2/2018   Medication Dose Route Frequency Provider Last Rate Last Dose    albuterol sulfate nebulizer solution 2.5 mg  2.5 mg Nebulization 1 time in Clinic/HOD Yuly Justin MD         No orders of the defined types were placed in this encounter.    Plan:   Mr. Sorto remains stable from the respiratory standpoint with no evidence of worsened COPD.  He remains minimally symptomatic and only uses rescue inhaler occasionally.  He remains a non-smoker and is up-to-date on his immunizations.    I recommend the following:  · Continue rescue albuterol inhaler as needed.  If he notes persistent/consistent inhaler use, he will contact the office so we can consider a step-up in his regimen.  · Nasal steroids spray (nasal fluticasone) over-the-counter as needed for likely allergic rhinitis.  · Return to Pulmonary Clinic annually if stable.  Given his past smoking history, he is an appropriate cancer for low dose chest CT scan for cancer screening.  (We did not discuss CT scan at this visit.)    I have copied Dr. Gregory (Primary Care) and Dr. Weller (Cardiology) on this note.    Melvin Sanders MD  Pulmonary/Critical Care Medicine

## 2018-11-10 ENCOUNTER — HOSPITAL ENCOUNTER (EMERGENCY)
Facility: HOSPITAL | Age: 68
Discharge: HOME | End: 2018-11-10
Attending: EMERGENCY MEDICINE
Payer: MEDICARE

## 2018-11-10 VITALS
SYSTOLIC BLOOD PRESSURE: 142 MMHG | OXYGEN SATURATION: 100 % | RESPIRATION RATE: 20 BRPM | DIASTOLIC BLOOD PRESSURE: 70 MMHG | HEART RATE: 81 BPM | TEMPERATURE: 99 F

## 2018-11-10 DIAGNOSIS — R55 SYNCOPE: ICD-10-CM

## 2018-11-10 DIAGNOSIS — R19.7 DIARRHEA, UNSPECIFIED TYPE: Primary | ICD-10-CM

## 2018-11-10 LAB
ALBUMIN SERPL BCP-MCNC: 4 G/DL
ALP SERPL-CCNC: 62 U/L
ALT SERPL W/O P-5'-P-CCNC: 41 U/L
ANION GAP SERPL CALC-SCNC: 11 MMOL/L
AST SERPL-CCNC: 28 U/L
BASOPHILS # BLD AUTO: 0.01 K/UL
BASOPHILS NFR BLD: 0.1 %
BILIRUB SERPL-MCNC: 1.7 MG/DL
BUN SERPL-MCNC: 21 MG/DL
CALCIUM SERPL-MCNC: 10 MG/DL
CHLORIDE SERPL-SCNC: 107 MMOL/L
CO2 SERPL-SCNC: 22 MMOL/L
CREAT SERPL-MCNC: 1.6 MG/DL
DIFFERENTIAL METHOD: ABNORMAL
EOSINOPHIL # BLD AUTO: 0 K/UL
EOSINOPHIL NFR BLD: 0.4 %
ERYTHROCYTE [DISTWIDTH] IN BLOOD BY AUTOMATED COUNT: 14.8 %
EST. GFR  (AFRICAN AMERICAN): 50.1 ML/MIN/1.73 M^2
EST. GFR  (NON AFRICAN AMERICAN): 43.3 ML/MIN/1.73 M^2
GLUCOSE SERPL-MCNC: 169 MG/DL
HCT VFR BLD AUTO: 48.5 %
HGB BLD-MCNC: 15.2 G/DL
IMM GRANULOCYTES # BLD AUTO: 0.02 K/UL
IMM GRANULOCYTES NFR BLD AUTO: 0.2 %
LYMPHOCYTES # BLD AUTO: 0.4 K/UL
LYMPHOCYTES NFR BLD: 4.2 %
MCH RBC QN AUTO: 28.5 PG
MCHC RBC AUTO-ENTMCNC: 31.3 G/DL
MCV RBC AUTO: 91 FL
MONOCYTES # BLD AUTO: 0.5 K/UL
MONOCYTES NFR BLD: 5.5 %
NEUTROPHILS # BLD AUTO: 7.5 K/UL
NEUTROPHILS NFR BLD: 89.6 %
NRBC BLD-RTO: 0 /100 WBC
PLATELET # BLD AUTO: 208 K/UL
PMV BLD AUTO: 9.8 FL
POTASSIUM SERPL-SCNC: 4 MMOL/L
PROT SERPL-MCNC: 7.7 G/DL
RBC # BLD AUTO: 5.33 M/UL
SODIUM SERPL-SCNC: 140 MMOL/L
TROPONIN I SERPL DL<=0.01 NG/ML-MCNC: <0.006 NG/ML
WBC # BLD AUTO: 8.37 K/UL

## 2018-11-10 PROCEDURE — 25000003 PHARM REV CODE 250: Performed by: ANESTHESIOLOGY

## 2018-11-10 PROCEDURE — 84484 ASSAY OF TROPONIN QUANT: CPT

## 2018-11-10 PROCEDURE — 93010 ELECTROCARDIOGRAM REPORT: CPT | Mod: ,,, | Performed by: INTERNAL MEDICINE

## 2018-11-10 PROCEDURE — 96360 HYDRATION IV INFUSION INIT: CPT

## 2018-11-10 PROCEDURE — 99284 EMERGENCY DEPT VISIT MOD MDM: CPT | Mod: ,,, | Performed by: EMERGENCY MEDICINE

## 2018-11-10 PROCEDURE — 93005 ELECTROCARDIOGRAM TRACING: CPT

## 2018-11-10 PROCEDURE — 80053 COMPREHEN METABOLIC PANEL: CPT

## 2018-11-10 PROCEDURE — 99284 EMERGENCY DEPT VISIT MOD MDM: CPT | Mod: 25

## 2018-11-10 PROCEDURE — 85025 COMPLETE CBC W/AUTO DIFF WBC: CPT

## 2018-11-10 RX ADMIN — SODIUM CHLORIDE 1000 ML: 0.9 INJECTION, SOLUTION INTRAVENOUS at 06:11

## 2018-11-10 NOTE — ED TRIAGE NOTES
Pt had an episode of syncope while sitting a table having a meal. Pt's eyes rolled back in his head and he lost consciousness for approximately 1-2 minutes. Pt is GCS 15 upon arrival. No trauma noted. Pt also reports 3 episodes of diarrhea that began this morning.

## 2018-11-10 NOTE — ED PROVIDER NOTES
"Encounter Date: 11/10/2018    SCRIBE #1 NOTE: I, Maggie Gerardo, am scribing for, and in the presence of,  Dr. Rice. I have scribed the following portions of the note - the Resident attestation.       History     Chief Complaint   Patient presents with    Loss of Consciousness     HPI   68M with PMHx of CKD III, COPD and HLD who [place] [facility] [place] after a syncopal episode at home. Patient states he has had an upset stomach for the last several days and has has diarrhea and not been eating much for the last 2 days. He went to a  today for his mother in law and was at home after sitting down with family when he passed out. He states he slumped over in his chair and does not remember what happened but when he "came to" he was sweating. He states after feeling somewhat better he went to the restroom and had loose bowel movements. He denies any chest pain or SOB associated with the episode.       Review of patient's allergies indicates:  No Known Allergies  Past Medical History:   Diagnosis Date    Chronic kidney disease     Colon polyp     removed by colonoscopy    Glaucoma (increased eye pressure)     Goiter     MNG    Insulin resistance     Thyroid disease     Tobacco abuse     Vitamin D deficiency disease      Past Surgical History:   Procedure Laterality Date    COLONOSCOPY N/A 10/28/2016    Procedure: COLONOSCOPY;  Surgeon: Melvin Berg MD;  Location: [address]);  Service: Endoscopy;  Laterality: N/A;    COLONOSCOPY N/A 10/28/2016    Performed by Melvin Berg MD at [facility] ([address] [place] [address])    COLONOSCOPY W/ BIOPSIES AND POLYPECTOMY       Family History   Problem Relation Age of Onset    Diabetes Mother     Alzheimer's disease Mother     Stroke Father     Hypertension Father      Social History     Tobacco Use    Smoking status: Former Smoker     Packs/day: 0.50     Years: 50.00     Pack years: 25.00     Types: Cigarettes     Last attempt to quit: 2017     Years since " quittin.5    Smokeless tobacco: Never Used   Substance Use Topics    Alcohol use: Yes     Alcohol/week: 0.6 oz     Types: 1 Glasses of wine per week     Comment: occasional    Drug use: No     Review of Systems   Constitutional: Denies weight loss, chills; + episode of weakness and syncope  Eyes: denies vision changes, loss or blurry vision.   ENT: Denies dysphagia, nasal discharge, ear pain or discharge.  Cardiovascular: Denies chest pain, palpitations, orthopnea, or claudication.  Respiratory: Denies cough, hemoptysis and wheezing.  GI: +several day hx of loss of appetite and diarrhea   : Denies dysuria, incontinence, or hematuria.  Musculoskeletal: Denies joint pain or myalgias.  Skin/breast: Denies rashes, lumps, lesions, or discharge.  Neurologic: Denies headache, vertigo, or paresthesias.  Psychiatric: denies suicidal ideation and changes in mood. Denies depression   Endocrine: Denies polyuria, polydipsia, heat/cold intolerance.  Hematologic/Lymph: Denies lymphadenopathy, easy bruising or easy bleeding.  Extremities: denies claudication; denies edema   Allergic/Immunologic: Denies rash, rhinitis.      Physical Exam     Initial Vitals [11/10/18 1727]   BP Pulse Resp Temp SpO2   128/82 92 20 99.3 °F (37.4 °C) 99 %      MAP       --         Physical Exam    Physical Exam:  General: alert, oriented and appears stated age  HENT: NC/AT.Conjunctivae/corneas clear. PERRL, EOMI. Nares normal. Septum midline. Mucosa normal. No drainage or sinus tenderness.  Neck: no adenopathy, no carotid bruit, no JVD, supple, symmetrical, trachea midline and thyroid not enlarged, symmetric, no tenderness/mass/nodules  Back: symmetric, no curvature. ROM normal. No CVA tenderness.  Lungs: clear to auscultation bilaterally, respiratory effort normal  CV: RRR, S1 and S2 Normal. No chest wall tenderness  Abdomen: S, NT, ND. Bowel sounds normal   Extremities: WWP, intact distal pulses. no cyanosis or edema  Skin: Skin color,  texture, turgor normal. No rashes or lesions  Lymph nodes: Cervical, supraclavicular, and axillary nodes normal.  Neurologic: Grossly normal    ED Course   Procedures  Labs Reviewed   CBC W/ AUTO DIFFERENTIAL - Abnormal; Notable for the following components:       Result Value    MCHC 31.3 (*)     RDW 14.8 (*)     Lymph # 0.4 (*)     Gran% 89.6 (*)     Lymph% 4.2 (*)     All other components within normal limits   COMPREHENSIVE METABOLIC PANEL - Abnormal; Notable for the following components:    CO2 22 (*)     Glucose 169 (*)     Creatinine 1.6 (*)     Total Bilirubin 1.7 (*)     eGFR if  50.1 (*)     eGFR if non  43.3 (*)     All other components within normal limits   TROPONIN I     EKG Readings: (Independently Interpreted)   Initial Reading: No STEMI. Previous EKG: Compared with most recent EKG Rhythm: Normal Sinus Rhythm. Heart Rate: 86. Ectopy: No Ectopy. Conduction: Normal. Axis: Normal. Clinical Impression: Normal Sinus Rhythm       Imaging Results    None          Medical Decision Making:   Initial Assessment:   68M with PMHx of CKD III, COPD and HLD who [place] [facility] [place] after a syncopal episode at home.  Differential Diagnosis:   Vasovagal episode  Dehydration  Cardiac arrhythmia   Clinical Tests:   Lab Tests: Ordered  Radiological Study: Ordered  Medical Tests: Ordered       APC / Resident Notes:   Given hx of recent volume loss, poor PO intake and recent emotional stress likely a vasovagal episode but discussed with patient need to rule out other causes-ie cardiac etiologies. Patient denies chest pain and SOB; had a stress echo in 2017 that was negative. Dispo pending results. Will obtain EKG, trop, cbc and cmp.  6:05 PM    Troponin negative; patient feeling much better after fluids. Denies chest pain and sob. Discussed likely discharge patient and warning signs for return; patient in favor of this plan an interested in going home. His daughter is an MD and  also feels this is appropriate and is comfortable with the plan. Discussed with Dr. Rice  6:55 PM  Yelitza Ortiz MD         Scribe Attestation:   Scribe #1: I performed the above scribed service and the documentation accurately describes the services I performed. I attest to the accuracy of the note.    Attending Attestation:   Physician Attestation Statement for Resident:  As the supervising MD   Physician Attestation Statement: I have personally seen and examined this patient.  . -: 6:44 PM.   Pt seen by me. Pt had syncopal episode today x1 while sitting down at dining table with family after returning from  home. Pt has had diarrhea yesterday and today. Denies CP or SOB. No convulsive act reported. No focal neuro deficits. Pt as baseline now with no complaints. Will check labs, give IV fluids, possible dehydration.   As the supervising MD I agree with the above treatment, course, plan, and disposition.                       Clinical Impression:   The primary encounter diagnosis was Diarrhea, unspecified type. A diagnosis of Syncope was also pertinent to this visit.      Disposition:   Disposition: Discharged  Condition: Stable                        Yelitza Ortiz MD  Resident  11/10/18 1855       Ta Rice MD  11/10/18 2003

## 2018-11-11 NOTE — ED NOTES
.  Patient identifiers for Hung Sorto 68 y.o. male checked and correct.  Chief Complaint   Patient presents with    Loss of Consciousness     Past Medical History:   Diagnosis Date    Chronic kidney disease     Colon polyp     removed by colonoscopy    Glaucoma (increased eye pressure)     Goiter     MNG    Insulin resistance     Thyroid disease     Tobacco abuse     Vitamin D deficiency disease      Allergies reported: Review of patient's allergies indicates:  No Known Allergies      LOC: Patient is awake, alert, and aware of environment with an appropriate affect. Patient is oriented x 3 and speaking appropriately. Pt had syncopal episode pta lasting 3 mins, reports he was in a sitting position, denies hitting head.   APPEARANCE: Patient resting comfortably and in no acute distress. Patient is clean and well groomed, patient's clothing is properly fastened.  SKIN: The skin is warm and dry. Patient has normal skin turgor and moist mucus membranes. Skin is intact; no bruising or breakdown noted.  MUSKULOSKELETAL: Patient is moving all extremities well, no obvious deformities noted. Pulses intact.   RESPIRATORY: Airway is open and patent. Respirations are spontaneous and non-labored with normal effort and rate, BBS=clear  CARDIAC: Patient has a normal rate and rhythm. NSR on cardiac monitor,No peripheral edema noted.   ABDOMEN: No distention noted. Bowel sounds active in all 4 quadrants. Soft and non-tender upon palpation. Pt c/o diarrhea episodes today. Reports having some stomach discomfort prior to diarrhea.   NEUROLOGICAL: pupils 3 mm, PERRL. Facial expression is symmetrical. Hand grasps are equal bilaterally. Normal sensation in all extremities when touched with finger.

## 2019-02-04 DIAGNOSIS — Z12.5 SCREENING PSA (PROSTATE SPECIFIC ANTIGEN): ICD-10-CM

## 2019-02-04 DIAGNOSIS — R73.03 PRE-DIABETES: ICD-10-CM

## 2019-02-04 DIAGNOSIS — E78.5 HYPERLIPIDEMIA, UNSPECIFIED HYPERLIPIDEMIA TYPE: Primary | ICD-10-CM

## 2019-02-04 RX ORDER — ATORVASTATIN CALCIUM 20 MG/1
TABLET, FILM COATED ORAL
Qty: 90 TABLET | Refills: 0 | Status: SHIPPED | OUTPATIENT
Start: 2019-02-04 | End: 2019-08-17 | Stop reason: SDUPTHER

## 2019-02-19 ENCOUNTER — LAB VISIT (OUTPATIENT)
Dept: LAB | Facility: HOSPITAL | Age: 69
End: 2019-02-19
Attending: INTERNAL MEDICINE
Payer: MEDICARE

## 2019-02-19 DIAGNOSIS — Z12.5 SCREENING PSA (PROSTATE SPECIFIC ANTIGEN): ICD-10-CM

## 2019-02-19 DIAGNOSIS — R73.03 PRE-DIABETES: ICD-10-CM

## 2019-02-19 DIAGNOSIS — E78.5 HYPERLIPIDEMIA, UNSPECIFIED HYPERLIPIDEMIA TYPE: ICD-10-CM

## 2019-02-19 LAB
CHOLEST SERPL-MCNC: 128 MG/DL
CHOLEST/HDLC SERPL: 2.8 {RATIO}
COMPLEXED PSA SERPL-MCNC: 0.73 NG/ML
ESTIMATED AVG GLUCOSE: 171 MG/DL
HBA1C MFR BLD HPLC: 7.6 %
HDLC SERPL-MCNC: 45 MG/DL
HDLC SERPL: 35.2 %
LDLC SERPL CALC-MCNC: 70.6 MG/DL
NONHDLC SERPL-MCNC: 83 MG/DL
TRIGL SERPL-MCNC: 62 MG/DL

## 2019-02-19 PROCEDURE — 36415 COLL VENOUS BLD VENIPUNCTURE: CPT

## 2019-02-19 PROCEDURE — 84153 ASSAY OF PSA TOTAL: CPT

## 2019-02-19 PROCEDURE — 80061 LIPID PANEL: CPT

## 2019-02-19 PROCEDURE — 83036 HEMOGLOBIN GLYCOSYLATED A1C: CPT

## 2019-02-26 ENCOUNTER — CLINICAL SUPPORT (OUTPATIENT)
Dept: INTERNAL MEDICINE | Facility: CLINIC | Age: 69
End: 2019-02-26
Payer: MEDICARE

## 2019-02-26 ENCOUNTER — OFFICE VISIT (OUTPATIENT)
Dept: INTERNAL MEDICINE | Facility: CLINIC | Age: 69
End: 2019-02-26
Payer: MEDICARE

## 2019-02-26 VITALS
HEART RATE: 68 BPM | WEIGHT: 187.63 LBS | DIASTOLIC BLOOD PRESSURE: 80 MMHG | BODY MASS INDEX: 26.27 KG/M2 | SYSTOLIC BLOOD PRESSURE: 136 MMHG | OXYGEN SATURATION: 97 % | HEIGHT: 71 IN

## 2019-02-26 DIAGNOSIS — J44.9 STAGE 2 MODERATE COPD BY GOLD CLASSIFICATION: ICD-10-CM

## 2019-02-26 DIAGNOSIS — N18.30 STAGE 3 CHRONIC KIDNEY DISEASE: ICD-10-CM

## 2019-02-26 DIAGNOSIS — E04.2 MULTINODULAR NON-TOXIC GOITER: ICD-10-CM

## 2019-02-26 DIAGNOSIS — K63.5 POLYP OF COLON, UNSPECIFIED PART OF COLON, UNSPECIFIED TYPE: Primary | ICD-10-CM

## 2019-02-26 DIAGNOSIS — Z12.9 SCREENING FOR CANCER: ICD-10-CM

## 2019-02-26 DIAGNOSIS — E11.9 DIABETES MELLITUS WITHOUT COMPLICATION: ICD-10-CM

## 2019-02-26 DIAGNOSIS — Z87.891 PERSONAL HISTORY OF NICOTINE DEPENDENCE: ICD-10-CM

## 2019-02-26 LAB
ALBUMIN/CREAT UR: NORMAL UG/MG
CREAT UR-MCNC: 111 MG/DL
MICROALBUMIN UR DL<=1MG/L-MCNC: <2.5 UG/ML

## 2019-02-26 PROCEDURE — 99999 PR PBB SHADOW E&M-EST. PATIENT-LVL I: CPT | Mod: PBBFAC,,,

## 2019-02-26 PROCEDURE — 82043 UR ALBUMIN QUANTITATIVE: CPT

## 2019-02-26 PROCEDURE — 99214 OFFICE O/P EST MOD 30 MIN: CPT | Mod: PBBFAC | Performed by: INTERNAL MEDICINE

## 2019-02-26 PROCEDURE — 99214 OFFICE O/P EST MOD 30 MIN: CPT | Mod: S$PBB,,, | Performed by: INTERNAL MEDICINE

## 2019-02-26 PROCEDURE — 99999 PR PBB SHADOW E&M-EST. PATIENT-LVL IV: CPT | Mod: PBBFAC,,, | Performed by: INTERNAL MEDICINE

## 2019-02-26 PROCEDURE — 99211 OFF/OP EST MAY X REQ PHY/QHP: CPT | Mod: PBBFAC,27,25

## 2019-02-26 PROCEDURE — 99214 PR OFFICE/OUTPT VISIT, EST, LEVL IV, 30-39 MIN: ICD-10-PCS | Mod: S$PBB,,, | Performed by: INTERNAL MEDICINE

## 2019-02-26 PROCEDURE — G0009 ADMIN PNEUMOCOCCAL VACCINE: HCPCS | Mod: PBBFAC

## 2019-02-26 PROCEDURE — 99999 PR PBB SHADOW E&M-EST. PATIENT-LVL IV: ICD-10-PCS | Mod: PBBFAC,,, | Performed by: INTERNAL MEDICINE

## 2019-02-26 PROCEDURE — 99999 PR PBB SHADOW E&M-EST. PATIENT-LVL I: ICD-10-PCS | Mod: PBBFAC,,,

## 2019-02-26 NOTE — PROGRESS NOTES
[facility]  CLINIC NOTE    Patient Name: Hung Sorto  YOB: 1950    PRESENTING HISTORY       History of Present Illness:  Mr. Hung Sorto is a 68 y.o. male here for an annual physical    1. DM: Patient now with HGBA1C 7.6 from 6.6 (2017), currently controlling diabetes with diet. Reports eating oat meal, salad and sometimes indulging with fries. He denies polyuria, polydipsia and increased thirst. He denies any recent changes in vision, he also denies any numbness or tingling.    2. CKD: Patient has CKDIII, he remains stable Cr 1.6 with GFR 50.    3. COPD: Patient has COPD with a 50pk year smoking history. He quit in 2017. He has an albuterol inhaler which he rarely uses. He denies any wheezing, SOB or dyspnea on exertion. He remains active an works frequently in his [employer] service.    4. HLD: He is currently on Atorvastatin 20mg. Current LDL 70, HDL 45.    5. Glaucoma: Patient follows with Opthalmology frequently. He denies any vision loss or blurry vision.    He otherwise has no other complaints today.    Review of Systems:  Constitutional: no fever or chills  Eyes: no visual changes  ENT: no nasal congestion or sore throat  Respiratory: no cough or shortness of breath  Cardiovascular: no chest pain or palpitations  Gastrointestinal: no nausea or vomiting, no abdominal pain or change in bowel habits  Genitourinary: no hematuria or dysuria  Musculoskeletal: no arthralgias or myalgias  Neurological: no seizures or tremors  Skin: No rashes    PAST HISTORY:     Past Medical History:   Diagnosis Date    Chronic kidney disease     Colon polyp     removed by colonoscopy    Glaucoma (increased eye pressure)     Goiter     MNG    Insulin resistance     Thyroid disease     Tobacco abuse     Vitamin D deficiency disease        Past Surgical History:   Procedure Laterality Date    COLONOSCOPY N/A 10/28/2016    [facility] at [facility]     COLONOSCOPY W/ BIOPSIES AND POLYPECTOMY         Family History   Problem Relation Age of Onset    Diabetes Mother     Alzheimer's disease Mother     Stroke Father     Hypertension Father        Social History     Socioeconomic History    Marital status:      Spouse name: None    Number of children: None    Years of education: None    Highest education level: None   Social Needs    Financial resource strain: None    Food insecurity - worry: None    Food insecurity - inability: None    Transportation needs - medical: None    Transportation needs - non-medical: None   Occupational History    Occupation: manager     Employer: [employer]     Comment: retired    Occupation: lawn work     Employer: OTHER     Comment: self-employed in senior living   Tobacco Use    Smoking status: Former Smoker     Packs/day: 0.50     Years: 50.00     Pack years: 25.00     Types: Cigarettes     Last attempt to quit: 2017     Years since quittin.8    Smokeless tobacco: Never Used   Substance and Sexual Activity    Alcohol use: Yes     Alcohol/week: 0.6 oz     Types: 1 Glasses of wine per week     Comment: occasional    Drug use: No    Sexual activity: None     Comment:    Other Topics Concern    None   Social History Narrative    None       MEDICATIONS & ALLERGIES:     Current Outpatient Medications on File Prior to Visit   Medication Sig    albuterol 90 mcg/actuation inhaler Inhale 2 puffs into the lungs every 6 (six) hours as needed for Wheezing.    ALPHAGAN P 0.1 % Drop 1 drop 3 (three) times daily.     aspirin (ECOTRIN) 81 MG EC tablet Take 81 mg by mouth once daily.      atorvastatin (LIPITOR) 20 MG tablet TAKE 1 TABLET DAILY    cholecalciferol, vitamin D3, (VITAMIN D3) 5,000 unit Tab Take 5,000 Units by mouth once daily.    dorzolamide (TRUSOPT) 2 % ophthalmic solution Place 1 drop into both eyes 3 (three) times daily.     latanoprost 0.005 % ophthalmic solution Place into both eyes  "once daily.      Current Facility-Administered Medications on File Prior to Visit   Medication    albuterol sulfate nebulizer solution 2.5 mg       Review of patient's allergies indicates:  No Known Allergies    OBJECTIVE:   Vital Signs:  Vitals:    02/26/19 0827   BP: 136/80   Pulse: 68   SpO2: 97%   Weight: 85.1 kg (187 lb 9.8 oz)   Height: 5' 11" (1.803 m)       No results found for this or any previous visit (from the past 24 hour(s)).      Physical Exam:   General:  Well developed, well nourished, no acute distress  HEENT:  Normocephalic, atraumatic, PERRL, EOMI, clear sclera, throat clear without erythema or exudates  Neck: No carotid bruits. No thyromegaly.   CVS:  RRR, S1 and S2 normal, no murmurs, rubs, gallops  Resp:  Lungs clear to auscultation, no wheezes, rales, rhonchi, cough  GI:  Abdomen soft, non-tender, non-distended, normoactive bowel sounds, no masses  MSK:  No muscle atrophy, cyanosis, peripheral edema, full range of motion  Skin:  No rashes, ulcers, erythema  Neuro:  CNII-XII grossly intact. No gross motor/sensory deficits  Psych:  Alert and oriented to person, place, and time    Protective Sensation (w/ 10 gram monofilament):  Right: Intact  Left: Intact    Visual Inspection:  Normal -  Bilateral    Pedal Pulses:   Right: Present  Left: Present    Posterior tibialis:   Right:Present  Left: Present    ASSESSMENT & PLAN:     Hung was seen today for annual exam.    Diagnoses and all orders for this visit:    Polyp of colon, unspecified part of colon, unspecified type    Screening for cancer  -     CT Chest Lung Screening Low Dose; Future    Diabetes mellitus without complication  -     Hemoglobin A1c; Future  -     Comprehensive metabolic panel; Future  -     Recommend continue diet modification  -     Start Januvia 100 mg daily (avoid Metformin due to renal insufficiency)  -     Return to clinic in 3 months for follow up.    Stage 2 moderate COPD by GOLD classification        -     Continue " Albuterol inhaler prn    Stage 3 chronic kidney disease  -     Microalbumin/creatinine urine ratio    Personal history of nicotine dependence   -     CT Chest Lung Screening Low Dose    Multinodular non-toxic goiter  -     TSH; Future    Other orders  -     SITagliptin (JANUVIA) 100 MG Tab; Take 1 tablet (100 mg total) by mouth once daily.  -     Patient to receive Pneumovax 23 today.      Patient seen and examined with Dr. Bri VILLELA.    Karma Brantley, DO  Internal Medicine PGY-1

## 2019-02-27 NOTE — PROGRESS NOTES
"I have personally taken the history and examined this patient and agree with the resident's note as stated above with the following thoughts:  /80 (BP Location: Right arm, Patient Position: Sitting, BP Method: Large (Manual))   Pulse 68   Ht 5' 11" (1.803 m)   Wt 85.1 kg (187 lb 9.8 oz)   SpO2 97%   BMI 26.17 kg/m²     Discussed getting vaccines up to date.  Discussed importance of low fat diet and exercise . Will check screening ling CT  "

## 2019-03-01 ENCOUNTER — HOSPITAL ENCOUNTER (OUTPATIENT)
Dept: RADIOLOGY | Facility: HOSPITAL | Age: 69
Discharge: HOME | End: 2019-03-01
Attending: INTERNAL MEDICINE
Payer: MEDICARE

## 2019-03-01 DIAGNOSIS — Z87.891 PERSONAL HISTORY OF NICOTINE DEPENDENCE: ICD-10-CM

## 2019-03-01 DIAGNOSIS — Z12.9 SCREENING FOR CANCER: ICD-10-CM

## 2019-03-01 PROCEDURE — G0297 LDCT FOR LUNG CA SCREEN: HCPCS | Mod: TC

## 2019-03-01 PROCEDURE — G0297 LDCT FOR LUNG CA SCREEN: HCPCS | Mod: 26,,, | Performed by: RADIOLOGY

## 2019-03-01 PROCEDURE — G0297 CT CHEST LUNG SCREENING LOW DOSE: ICD-10-PCS | Mod: 26,,, | Performed by: RADIOLOGY

## 2019-03-08 ENCOUNTER — TELEPHONE (OUTPATIENT)
Dept: INTERNAL MEDICINE | Facility: CLINIC | Age: 69
End: 2019-03-08

## 2019-03-08 DIAGNOSIS — N28.89 RENAL MASS: Primary | ICD-10-CM

## 2019-03-08 NOTE — TELEPHONE ENCOUNTER
Please call-- CT  Of his lungs looks ok-- we will continue to monitor.  The ct did  something is the kidney-- they recommend an ultrasound to follow that up.  It is probably  a cyst which is benign, but we should should a renal us.  -- ordered in .

## 2019-03-22 ENCOUNTER — HOSPITAL ENCOUNTER (OUTPATIENT)
Dept: RADIOLOGY | Facility: HOSPITAL | Age: 69
Discharge: HOME | End: 2019-03-22
Attending: INTERNAL MEDICINE
Payer: MEDICARE

## 2019-03-22 DIAGNOSIS — N28.89 RENAL MASS: ICD-10-CM

## 2019-03-22 PROCEDURE — 76770 US RETROPERITONEAL COMPLETE: ICD-10-PCS | Mod: 26,,, | Performed by: RADIOLOGY

## 2019-03-22 PROCEDURE — 76770 US EXAM ABDO BACK WALL COMP: CPT | Mod: TC

## 2019-03-22 PROCEDURE — 76770 US EXAM ABDO BACK WALL COMP: CPT | Mod: 26,,, | Performed by: RADIOLOGY

## 2019-05-17 ENCOUNTER — LAB VISIT (OUTPATIENT)
Dept: LAB | Facility: HOSPITAL | Age: 69
End: 2019-05-17
Attending: INTERNAL MEDICINE
Payer: MEDICARE

## 2019-05-17 DIAGNOSIS — E11.9 DIABETES MELLITUS WITHOUT COMPLICATION: ICD-10-CM

## 2019-05-17 DIAGNOSIS — E04.2 MULTINODULAR NON-TOXIC GOITER: ICD-10-CM

## 2019-05-17 LAB
ALBUMIN SERPL BCP-MCNC: 4 G/DL (ref 3.5–5.2)
ALP SERPL-CCNC: 46 U/L (ref 55–135)
ALT SERPL W/O P-5'-P-CCNC: 35 U/L (ref 10–44)
ANION GAP SERPL CALC-SCNC: 9 MMOL/L (ref 8–16)
AST SERPL-CCNC: 33 U/L (ref 10–40)
BILIRUB SERPL-MCNC: 1.1 MG/DL (ref 0.1–1)
BUN SERPL-MCNC: 20 MG/DL (ref 8–23)
CALCIUM SERPL-MCNC: 10.2 MG/DL (ref 8.7–10.5)
CHLORIDE SERPL-SCNC: 109 MMOL/L (ref 95–110)
CO2 SERPL-SCNC: 23 MMOL/L (ref 23–29)
CREAT SERPL-MCNC: 1.6 MG/DL (ref 0.5–1.4)
EST. GFR  (AFRICAN AMERICAN): 50 ML/MIN/1.73 M^2
EST. GFR  (NON AFRICAN AMERICAN): 43 ML/MIN/1.73 M^2
ESTIMATED AVG GLUCOSE: 140 MG/DL (ref 68–131)
GLUCOSE SERPL-MCNC: 123 MG/DL (ref 70–110)
HBA1C MFR BLD HPLC: 6.5 % (ref 4–5.6)
POTASSIUM SERPL-SCNC: 4.2 MMOL/L (ref 3.5–5.1)
PROT SERPL-MCNC: 7.2 G/DL (ref 6–8.4)
SODIUM SERPL-SCNC: 141 MMOL/L (ref 136–145)
TSH SERPL DL<=0.005 MIU/L-ACNC: 0.61 UIU/ML (ref 0.4–4)

## 2019-05-17 PROCEDURE — 83036 HEMOGLOBIN GLYCOSYLATED A1C: CPT

## 2019-05-17 PROCEDURE — 36415 COLL VENOUS BLD VENIPUNCTURE: CPT

## 2019-05-17 PROCEDURE — 84443 ASSAY THYROID STIM HORMONE: CPT

## 2019-05-17 PROCEDURE — 80053 COMPREHEN METABOLIC PANEL: CPT

## 2019-05-20 ENCOUNTER — OFFICE VISIT (OUTPATIENT)
Dept: INTERNAL MEDICINE | Facility: CLINIC | Age: 69
End: 2019-05-20
Payer: MEDICARE

## 2019-05-20 VITALS
DIASTOLIC BLOOD PRESSURE: 72 MMHG | WEIGHT: 185.19 LBS | BODY MASS INDEX: 25.93 KG/M2 | OXYGEN SATURATION: 99 % | HEART RATE: 62 BPM | HEIGHT: 71 IN | SYSTOLIC BLOOD PRESSURE: 130 MMHG

## 2019-05-20 DIAGNOSIS — Z12.5 SCREENING PSA (PROSTATE SPECIFIC ANTIGEN): ICD-10-CM

## 2019-05-20 DIAGNOSIS — J44.9 STAGE 2 MODERATE COPD BY GOLD CLASSIFICATION: ICD-10-CM

## 2019-05-20 DIAGNOSIS — K63.5 POLYP OF COLON, UNSPECIFIED PART OF COLON, UNSPECIFIED TYPE: ICD-10-CM

## 2019-05-20 DIAGNOSIS — N18.30 STAGE 3 CHRONIC KIDNEY DISEASE: Primary | ICD-10-CM

## 2019-05-20 DIAGNOSIS — E04.2 MULTINODULAR NON-TOXIC GOITER: ICD-10-CM

## 2019-05-20 DIAGNOSIS — R79.9 ABNORMAL FINDING OF BLOOD CHEMISTRY: ICD-10-CM

## 2019-05-20 PROCEDURE — 99214 PR OFFICE/OUTPT VISIT, EST, LEVL IV, 30-39 MIN: ICD-10-PCS | Mod: S$PBB,,, | Performed by: INTERNAL MEDICINE

## 2019-05-20 PROCEDURE — 99999 PR PBB SHADOW E&M-EST. PATIENT-LVL III: CPT | Mod: PBBFAC,,, | Performed by: INTERNAL MEDICINE

## 2019-05-20 PROCEDURE — 99213 OFFICE O/P EST LOW 20 MIN: CPT | Mod: PBBFAC | Performed by: INTERNAL MEDICINE

## 2019-05-20 PROCEDURE — 99214 OFFICE O/P EST MOD 30 MIN: CPT | Mod: S$PBB,,, | Performed by: INTERNAL MEDICINE

## 2019-05-20 PROCEDURE — 99999 PR PBB SHADOW E&M-EST. PATIENT-LVL III: ICD-10-PCS | Mod: PBBFAC,,, | Performed by: INTERNAL MEDICINE

## 2019-05-20 NOTE — PROGRESS NOTES
"Mr. Hung Sorto is a 68 y.o. male here for an annual physical     1. DM: Patient now with HgBA1C 6.5 , currently controlling diabetes with diet. Reports eating oat meal, salad and sometimes indulging with fries. He denies polyuria, polydipsia and increased thirst. He denies any recent changes in vision, he also denies any numbness or tingling.     2. CKD: Patient has CKDIII, he remains stable Cr 1.6 with GFR 50ml/min.       3. COPD: Patient has COPD with a 50 pk year smoking history. He quit in 2017. He has an albuterol inhaler which he rarely uses. He denies any wheezing, SOB or dyspnea on exertion. 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  He did his Lung screening CT  In 3/2019.       4. HLD: He is currently on Atorvastatin 20mg. Current LDL 70, HDL 45.     5. Glaucoma: Patient follows with Opthalmology frequently. He denies any vision loss or blurry vision.         He otherwise has no other complaints today.     Review of Systems:  Constitutional: no fever or chills  Eyes: no visual changes  ENT: no nasal congestion or sore throat  Respiratory: no cough or shortness of breath  Cardiovascular: no chest pain or palpitations  Gastrointestinal: no nausea or vomiting, no abdominal pain or change in bowel habits  Genitourinary: no hematuria or dysuria  Musculoskeletal: no arthralgias or myalgias  Neurological: no seizures or tremors  Skin: No rashes      Review of patient's allergies indicates:  No Known Allergies     OBJECTIVE:   Vital Signs:  /72 (BP Location: Right arm, Patient Position: Sitting, BP Method: Large (Manual))   Pulse 62   Ht 5' 11" (1.803 m)   Wt 84 kg (185 lb 3 oz)   SpO2 99%   BMI 25.83 kg/m²        Physical Exam:   General:  Well developed, well nourished, no acute distress  HEENT:  Normocephalic, atraumatic, PERRL, EOMI, clear sclera, throat clear without erythema or exudates  Neck: No carotid bruits. No thyromegaly.   CVS:  RRR, S1 and S2 normal, no murmurs, " rubs, gallops  Resp:  Lungs clear to auscultation, no wheezes, rales, rhonchi, cough  GI:  Abdomen soft, non-tender, non-distended, normoactive bowel sounds, no masses  MSK:  No muscle atrophy, cyanosis, peripheral edema, full range of motion  Skin:  No rashes, ulcers, erythema  Neuro:  CNII-XII grossly intact. No gross motor/sensory deficits  Psych:  Alert and oriented to person, place, and time      ASSESSMENT & PLAN:      Hung was seen today for annual exam.     Diagnoses and all orders for this visit:     Polyp of colon, unspecified part of colon, unspecified type 10/2026     Screening for cancer  -     Diabetes mellitus without complication  -    -     Recommend continue diet modification  -      continue  The  Januvia 100 mg daily (avoid Metformin due to renal insufficiency)  -     Return to clinic in 4 months for follow up.     Stage 2 moderate COPD by GOLD classification        -     Continue Albuterol inhaler prn  CT Chest Lung Screening Low Dose; next year-- 3/2020        Stage 3 chronic kidney disease  -    reviewed-- has benign renal cyst.      Personal history of nicotine dependence   -     CT Chest Lung Screening Low Dose     Multinodular non-toxic goiter  -     TSH; was normal       Need shingrix when it become available

## 2019-08-19 RX ORDER — ATORVASTATIN CALCIUM 20 MG/1
TABLET, FILM COATED ORAL
Qty: 90 TABLET | Refills: 4 | Status: SHIPPED | OUTPATIENT
Start: 2019-08-19 | End: 2020-08-31

## 2019-11-08 ENCOUNTER — PATIENT OUTREACH (OUTPATIENT)
Dept: ADMINISTRATIVE | Facility: HOSPITAL | Age: 69
End: 2019-11-08

## 2019-11-22 ENCOUNTER — OFFICE VISIT (OUTPATIENT)
Dept: INTERNAL MEDICINE | Facility: CLINIC | Age: 69
End: 2019-11-22
Payer: MEDICARE

## 2019-11-22 VITALS
HEART RATE: 75 BPM | DIASTOLIC BLOOD PRESSURE: 84 MMHG | OXYGEN SATURATION: 97 % | SYSTOLIC BLOOD PRESSURE: 126 MMHG | BODY MASS INDEX: 26.02 KG/M2 | WEIGHT: 185.88 LBS | HEIGHT: 71 IN

## 2019-11-22 DIAGNOSIS — Z87.891 H/O TOBACCO USE, PRESENTING HAZARDS TO HEALTH: ICD-10-CM

## 2019-11-22 DIAGNOSIS — K63.5 POLYP OF COLON, UNSPECIFIED PART OF COLON, UNSPECIFIED TYPE: Primary | ICD-10-CM

## 2019-11-22 DIAGNOSIS — E04.2 MULTINODULAR NON-TOXIC GOITER: ICD-10-CM

## 2019-11-22 DIAGNOSIS — N18.30 CONTROLLED TYPE 2 DIABETES MELLITUS WITH STAGE 3 CHRONIC KIDNEY DISEASE, WITHOUT LONG-TERM CURRENT USE OF INSULIN: ICD-10-CM

## 2019-11-22 DIAGNOSIS — E11.22 CONTROLLED TYPE 2 DIABETES MELLITUS WITH STAGE 3 CHRONIC KIDNEY DISEASE, WITHOUT LONG-TERM CURRENT USE OF INSULIN: ICD-10-CM

## 2019-11-22 DIAGNOSIS — E78.5 HYPERLIPIDEMIA, UNSPECIFIED HYPERLIPIDEMIA TYPE: ICD-10-CM

## 2019-11-22 DIAGNOSIS — N18.30 STAGE 3 CHRONIC KIDNEY DISEASE: ICD-10-CM

## 2019-11-22 PROCEDURE — 1159F PR MEDICATION LIST DOCUMENTED IN MEDICAL RECORD: ICD-10-PCS | Mod: ,,, | Performed by: INTERNAL MEDICINE

## 2019-11-22 PROCEDURE — 99999 PR PBB SHADOW E&M-EST. PATIENT-LVL III: CPT | Mod: PBBFAC,,, | Performed by: INTERNAL MEDICINE

## 2019-11-22 PROCEDURE — 99213 OFFICE O/P EST LOW 20 MIN: CPT | Mod: PBBFAC | Performed by: INTERNAL MEDICINE

## 2019-11-22 PROCEDURE — 99999 PR PBB SHADOW E&M-EST. PATIENT-LVL III: ICD-10-PCS | Mod: PBBFAC,,, | Performed by: INTERNAL MEDICINE

## 2019-11-22 PROCEDURE — 99214 OFFICE O/P EST MOD 30 MIN: CPT | Mod: S$PBB,,, | Performed by: INTERNAL MEDICINE

## 2019-11-22 PROCEDURE — 1159F MED LIST DOCD IN RCRD: CPT | Mod: ,,, | Performed by: INTERNAL MEDICINE

## 2019-11-22 PROCEDURE — 1126F AMNT PAIN NOTED NONE PRSNT: CPT | Mod: ,,, | Performed by: INTERNAL MEDICINE

## 2019-11-22 PROCEDURE — 1126F PR PAIN SEVERITY QUANTIFIED, NO PAIN PRESENT: ICD-10-PCS | Mod: ,,, | Performed by: INTERNAL MEDICINE

## 2019-11-22 PROCEDURE — 99214 PR OFFICE/OUTPT VISIT, EST, LEVL IV, 30-39 MIN: ICD-10-PCS | Mod: S$PBB,,, | Performed by: INTERNAL MEDICINE

## 2019-11-22 NOTE — PROGRESS NOTES
"Mr. Hung Sorto is a 69 y.o. male here for an annual physical     1. DM: Patient now with HgBA1C 6.5 (5/2019) , currently controlling diabetes with diet. Reports eating oat meal, salad and sometimes indulging with fries. He denies polyuria, polydipsia and increased thirst. He denies any recent changes in vision, he also denies any numbness or tingling.     2. CKD: Patient has CKDIII, he remains stable Cr 1.6 (5/2019) with GFR 50ml/min.       3. COPD: Patient has COPD with a 50 pk year smoking history. He quit in 2017. He has an albuterol inhaler which he rarely uses. He denies any wheezing, SOB or dyspnea on exertion. 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  He did his Lung screening CT  In 3/2019.       4. HLD: He is currently on Atorvastatin 20mg. Current LDL 70, HDL 45.     5. Glaucoma: Patient follows with Opthalmology frequently. He denies any vision loss or blurry vision.           He otherwise has no other complaints today.     Review of Systems:  Drove from [place] yesterday   Constitutional: no fever or chills  Eyes: no visual changes  ENT: no nasal congestion or sore throat  Respiratory: no cough or shortness of breath  Cardiovascular: no chest pain or palpitations  Gastrointestinal: no nausea or vomiting, no abdominal pain or change in bowel habits  Genitourinary: no hematuria or dysuria  Musculoskeletal: no arthralgias or myalgias  Neurological: no seizures or tremors  Skin: No rashes      Review of patient's allergies indicates:  No Known Allergies     OBJECTIVE:   Vital Signs:  /84 (BP Location: Left arm, Patient Position: Sitting, BP Method: Large (Manual))   Pulse 75   Ht 5' 11" (1.803 m)   Wt 84.3 kg (185 lb 13.6 oz)   SpO2 97%   BMI 25.92 kg/m²           Physical Exam:   General:  Well developed, well nourished, no acute distress  Eyes: look a little red-- he has glaucoma and he says the eye drops cause this.    HEENT:  Normocephalic, atraumatic, PERRL, " EOMI, clear sclera, throat clear without erythema or exudates  Neck: No carotid bruits. No thyromegaly.   CVS:  RRR, S1 and S2 normal, no murmurs, rubs, gallops  Resp:  Lungs clear to auscultation, no wheezes, rales, rhonchi, cough  GI:  Abdomen soft, non-tender, non-distended, normoactive bowel sounds, no masses  MSK:  No muscle atrophy, cyanosis, peripheral edema, full range of motion  Skin:  No rashes, ulcers, erythema  Neuro:  CNII-XII grossly intact. No gross motor/sensory deficits  Psych:  Alert and oriented to person, place, and time      ASSESSMENT & PLAN:      Hung was seen today for annual exam.     Diagnoses and all orders for this visit:     Polyp of colon, unspecified part of colon, unspecified type 10/2026     Screening for cancer  -     Diabetes mellitus without complication  -    -     Recommend continue diet modification  -      continue  The  Januvia 100 mg daily (avoid Metformin due to renal insufficiency)  -     Return to clinic in 4 months for follow up.     Stage 2 moderate COPD by GOLD classification        -     Continue Albuterol inhaler prn  CT Chest Lung Screening Low Dose; next year-- 3/2020         Stage 3 chronic kidney disease  -    reviewed-- has benign renal cyst.      Personal history of nicotine dependence   -     CT Chest Lung Screening Low Dose     Multinodular non-toxic goiter  -     TSH; was normal         Need shingrix when it become available

## 2019-12-16 ENCOUNTER — TELEPHONE (OUTPATIENT)
Dept: INTERNAL MEDICINE | Facility: CLINIC | Age: 69
End: 2019-12-16

## 2020-03-05 ENCOUNTER — TELEPHONE (OUTPATIENT)
Dept: INTERNAL MEDICINE | Facility: CLINIC | Age: 70
End: 2020-03-05

## 2020-03-05 RX ORDER — GLIMEPIRIDE 4 MG/1
4 TABLET ORAL
Qty: 90 TABLET | Refills: 3 | Status: SHIPPED | OUTPATIENT
Start: 2020-03-05 | End: 2021-03-15 | Stop reason: SDUPTHER

## 2020-03-05 NOTE — TELEPHONE ENCOUNTER
Okay, I sent in a prescription for Amaryl.  He needs to make sure he eats we takes his medication.  Him please make sure he has a follow-up it with me in 2 months with labs that are already in the computer.  Thank you

## 2020-03-05 NOTE — TELEPHONE ENCOUNTER
Spoke with pt and informed him on what was stated on new medication. Pt understands and will pick it up from pharmacy.   Will mail reminders to pt about appt.

## 2020-03-05 NOTE — TELEPHONE ENCOUNTER
----- Message from Lam Parikh sent at 3/5/2020 12:19 PM CST -----  Contact: Self   Patient state the Rx SITagliptin (JANUVIA) 100 MG Tab price went up to $500.00. Patient is asking for Rx that is equal to Januvia at a lesser call. Please call and advise.

## 2020-05-11 ENCOUNTER — LAB VISIT (OUTPATIENT)
Dept: LAB | Facility: HOSPITAL | Age: 70
End: 2020-05-11
Attending: INTERNAL MEDICINE
Payer: MEDICARE

## 2020-05-11 DIAGNOSIS — E78.5 HYPERLIPIDEMIA, UNSPECIFIED HYPERLIPIDEMIA TYPE: ICD-10-CM

## 2020-05-11 DIAGNOSIS — Z12.5 SCREENING PSA (PROSTATE SPECIFIC ANTIGEN): ICD-10-CM

## 2020-05-11 DIAGNOSIS — N18.30 STAGE 3 CHRONIC KIDNEY DISEASE: ICD-10-CM

## 2020-05-11 DIAGNOSIS — N18.30 CONTROLLED TYPE 2 DIABETES MELLITUS WITH STAGE 3 CHRONIC KIDNEY DISEASE, WITHOUT LONG-TERM CURRENT USE OF INSULIN: ICD-10-CM

## 2020-05-11 DIAGNOSIS — E11.22 CONTROLLED TYPE 2 DIABETES MELLITUS WITH STAGE 3 CHRONIC KIDNEY DISEASE, WITHOUT LONG-TERM CURRENT USE OF INSULIN: ICD-10-CM

## 2020-05-11 LAB
25(OH)D3+25(OH)D2 SERPL-MCNC: 48 NG/ML (ref 30–96)
ALBUMIN SERPL BCP-MCNC: 4.1 G/DL (ref 3.5–5.2)
ALP SERPL-CCNC: 57 U/L (ref 55–135)
ALT SERPL W/O P-5'-P-CCNC: 34 U/L (ref 10–44)
ANION GAP SERPL CALC-SCNC: 5 MMOL/L (ref 8–16)
AST SERPL-CCNC: 28 U/L (ref 10–40)
BASOPHILS # BLD AUTO: 0.02 K/UL (ref 0–0.2)
BASOPHILS NFR BLD: 0.4 % (ref 0–1.9)
BILIRUB SERPL-MCNC: 1.3 MG/DL (ref 0.1–1)
BUN SERPL-MCNC: 20 MG/DL (ref 8–23)
CALCIUM SERPL-MCNC: 10.1 MG/DL (ref 8.7–10.5)
CHLORIDE SERPL-SCNC: 107 MMOL/L (ref 95–110)
CHOLEST SERPL-MCNC: 88 MG/DL (ref 120–199)
CHOLEST/HDLC SERPL: 2.6 {RATIO} (ref 2–5)
CO2 SERPL-SCNC: 28 MMOL/L (ref 23–29)
COMPLEXED PSA SERPL-MCNC: 1.1 NG/ML (ref 0–4)
CREAT SERPL-MCNC: 1.5 MG/DL (ref 0.5–1.4)
DIFFERENTIAL METHOD: ABNORMAL
EOSINOPHIL # BLD AUTO: 0.2 K/UL (ref 0–0.5)
EOSINOPHIL NFR BLD: 4.5 % (ref 0–8)
ERYTHROCYTE [DISTWIDTH] IN BLOOD BY AUTOMATED COUNT: 14.9 % (ref 11.5–14.5)
EST. GFR  (AFRICAN AMERICAN): 53.8 ML/MIN/1.73 M^2
EST. GFR  (NON AFRICAN AMERICAN): 46.5 ML/MIN/1.73 M^2
ESTIMATED AVG GLUCOSE: 166 MG/DL (ref 68–131)
GLUCOSE SERPL-MCNC: 176 MG/DL (ref 70–110)
HBA1C MFR BLD HPLC: 7.4 % (ref 4–5.6)
HCT VFR BLD AUTO: 50.6 % (ref 40–54)
HDLC SERPL-MCNC: 34 MG/DL (ref 40–75)
HDLC SERPL: 38.6 % (ref 20–50)
HGB BLD-MCNC: 15.3 G/DL (ref 14–18)
IMM GRANULOCYTES # BLD AUTO: 0.01 K/UL (ref 0–0.04)
IMM GRANULOCYTES NFR BLD AUTO: 0.2 % (ref 0–0.5)
LDLC SERPL CALC-MCNC: 44.6 MG/DL (ref 63–159)
LYMPHOCYTES # BLD AUTO: 1.7 K/UL (ref 1–4.8)
LYMPHOCYTES NFR BLD: 32 % (ref 18–48)
MCH RBC QN AUTO: 27.8 PG (ref 27–31)
MCHC RBC AUTO-ENTMCNC: 30.2 G/DL (ref 32–36)
MCV RBC AUTO: 92 FL (ref 82–98)
MONOCYTES # BLD AUTO: 0.7 K/UL (ref 0.3–1)
MONOCYTES NFR BLD: 12.8 % (ref 4–15)
NEUTROPHILS # BLD AUTO: 2.7 K/UL (ref 1.8–7.7)
NEUTROPHILS NFR BLD: 50.1 % (ref 38–73)
NONHDLC SERPL-MCNC: 54 MG/DL
NRBC BLD-RTO: 0 /100 WBC
PLATELET # BLD AUTO: 245 K/UL (ref 150–350)
PMV BLD AUTO: 11.4 FL (ref 9.2–12.9)
POTASSIUM SERPL-SCNC: 4.6 MMOL/L (ref 3.5–5.1)
PROT SERPL-MCNC: 7.6 G/DL (ref 6–8.4)
RBC # BLD AUTO: 5.5 M/UL (ref 4.6–6.2)
SODIUM SERPL-SCNC: 140 MMOL/L (ref 136–145)
TRIGL SERPL-MCNC: 47 MG/DL (ref 30–150)
WBC # BLD AUTO: 5.31 K/UL (ref 3.9–12.7)

## 2020-05-11 PROCEDURE — 36415 COLL VENOUS BLD VENIPUNCTURE: CPT | Mod: PN

## 2020-05-11 PROCEDURE — 82306 VITAMIN D 25 HYDROXY: CPT

## 2020-05-11 PROCEDURE — 84153 ASSAY OF PSA TOTAL: CPT

## 2020-05-11 PROCEDURE — 83036 HEMOGLOBIN GLYCOSYLATED A1C: CPT

## 2020-05-11 PROCEDURE — 85025 COMPLETE CBC W/AUTO DIFF WBC: CPT

## 2020-05-11 PROCEDURE — 80061 LIPID PANEL: CPT

## 2020-05-11 PROCEDURE — 80053 COMPREHEN METABOLIC PANEL: CPT

## 2020-05-18 ENCOUNTER — HOSPITAL ENCOUNTER (OUTPATIENT)
Dept: RADIOLOGY | Facility: HOSPITAL | Age: 70
Discharge: HOME | End: 2020-05-18
Attending: INTERNAL MEDICINE
Payer: MEDICARE

## 2020-05-18 ENCOUNTER — OFFICE VISIT (OUTPATIENT)
Dept: INTERNAL MEDICINE | Facility: CLINIC | Age: 70
End: 2020-05-18
Payer: MEDICARE

## 2020-05-18 VITALS
WEIGHT: 190.25 LBS | OXYGEN SATURATION: 98 % | BODY MASS INDEX: 26.64 KG/M2 | HEIGHT: 71 IN | DIASTOLIC BLOOD PRESSURE: 75 MMHG | SYSTOLIC BLOOD PRESSURE: 130 MMHG | HEART RATE: 65 BPM

## 2020-05-18 DIAGNOSIS — N18.30 TYPE 2 DIABETES MELLITUS WITH STAGE 3 CHRONIC KIDNEY DISEASE, WITHOUT LONG-TERM CURRENT USE OF INSULIN: ICD-10-CM

## 2020-05-18 DIAGNOSIS — E11.22 TYPE 2 DIABETES MELLITUS WITH STAGE 3 CHRONIC KIDNEY DISEASE, WITHOUT LONG-TERM CURRENT USE OF INSULIN: ICD-10-CM

## 2020-05-18 DIAGNOSIS — Z87.891 PERSONAL HISTORY OF NICOTINE DEPENDENCE: ICD-10-CM

## 2020-05-18 DIAGNOSIS — Z12.9 SCREENING FOR CANCER: ICD-10-CM

## 2020-05-18 DIAGNOSIS — N18.30 STAGE 3 CHRONIC KIDNEY DISEASE: ICD-10-CM

## 2020-05-18 DIAGNOSIS — E04.2 MULTINODULAR NON-TOXIC GOITER: ICD-10-CM

## 2020-05-18 DIAGNOSIS — J44.9 STAGE 2 MODERATE COPD BY GOLD CLASSIFICATION: ICD-10-CM

## 2020-05-18 DIAGNOSIS — K63.5 POLYP OF COLON, UNSPECIFIED PART OF COLON, UNSPECIFIED TYPE: Primary | ICD-10-CM

## 2020-05-18 DIAGNOSIS — Z87.891 H/O TOBACCO USE, PRESENTING HAZARDS TO HEALTH: ICD-10-CM

## 2020-05-18 LAB
ALBUMIN/CREAT UR: 3.9 UG/MG (ref 0–30)
CREAT UR-MCNC: 155 MG/DL (ref 23–375)
MICROALBUMIN UR DL<=1MG/L-MCNC: 6 UG/ML

## 2020-05-18 PROCEDURE — G0297 CT CHEST LUNG SCREENING LOW DOSE: ICD-10-PCS | Mod: 26,,, | Performed by: RADIOLOGY

## 2020-05-18 PROCEDURE — 99999 PR PBB SHADOW E&M-EST. PATIENT-LVL III: CPT | Mod: PBBFAC,,, | Performed by: INTERNAL MEDICINE

## 2020-05-18 PROCEDURE — 82043 UR ALBUMIN QUANTITATIVE: CPT

## 2020-05-18 PROCEDURE — 99214 PR OFFICE/OUTPT VISIT, EST, LEVL IV, 30-39 MIN: ICD-10-PCS | Mod: S$PBB,,, | Performed by: INTERNAL MEDICINE

## 2020-05-18 PROCEDURE — G0297 LDCT FOR LUNG CA SCREEN: HCPCS | Mod: 26,,, | Performed by: RADIOLOGY

## 2020-05-18 PROCEDURE — 99999 PR PBB SHADOW E&M-EST. PATIENT-LVL III: ICD-10-PCS | Mod: PBBFAC,,, | Performed by: INTERNAL MEDICINE

## 2020-05-18 PROCEDURE — 99214 OFFICE O/P EST MOD 30 MIN: CPT | Mod: S$PBB,,, | Performed by: INTERNAL MEDICINE

## 2020-05-18 PROCEDURE — G0297 LDCT FOR LUNG CA SCREEN: HCPCS | Mod: TC

## 2020-05-18 PROCEDURE — 99213 OFFICE O/P EST LOW 20 MIN: CPT | Mod: PBBFAC,25 | Performed by: INTERNAL MEDICINE

## 2020-05-18 NOTE — PROGRESS NOTES
"Mr. Hung Sorto is a 69 y.o. male here for an annual physical     1. DM: Patient now with HgBA1C 7.4 (5/2020) , currently controlling diabetes with glimepiride he was on Januvia, but it was too expensive .  . Reports eating oat meal, salad and sometimes indulging with fries. He denies polyuria, polydipsia and increased thirst. He denies any recent changes in vision, he also denies any numbness or tingling.He has gained 5 # since last time.  He is still working during the pandemic doing yard.       2. CKD: Patient has CKDIII, he remains stable Cr 1.5 (5/2020) with GFR 54 ml/min.       3. COPD: Patient has COPD with a 50 pk year smoking history. He quit in 2017. He has an albuterol inhaler which he rarely uses. He denies any wheezing, SOB or dyspnea on exertion. 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  He did his Lung screening CT  In 3/2019.       4. HLD: He is currently on Atorvastatin 20mg. Current LDL 88, HDL 34.     5. Glaucoma: Patient follows with Opthalmology frequently 12/2019. He denies any vision loss or blurry vision.           He otherwise has no other complaints today.     Review of Systems:   He has gained 5 # since last visit.   Constitutional: no fever or chills  Eyes: no visual changes  ENT: no nasal congestion or sore throat  Respiratory: no cough or shortness of breath  Cardiovascular: no chest pain or palpitations  Gastrointestinal: no nausea or vomiting, no abdominal pain or change in bowel habits  Genitourinary: no hematuria or dysuria  Musculoskeletal: no arthralgias or myalgias  Neurological: no seizures or tremors  Skin: No rashes      Review of patient's allergies indicates:  No Known Allergies     OBJECTIVE:   Vital Signs:         /75 (BP Location: Right arm, Patient Position: Sitting, BP Method: Large (Manual))   Pulse 65   Ht 5' 11" (1.803 m)   Wt 86.3 kg (190 lb 4.1 oz)   SpO2 98%   BMI 26.54 kg/m²        Physical Exam:   General:  Well developed, " well nourished, no acute distress  Eyes: look a little red-- he has glaucoma and he says the eye drops cause this.    HEENT:  Normocephalic, atraumatic, PERRL, EOMI, clear sclera, throat clear without erythema or exudates  Neck: No carotid bruits. No thyromegaly.   CVS:  RRR, S1 and S2 normal, no murmurs, rubs, gallops  Resp:  Lungs clear to auscultation, no wheezes, rales, rhonchi, cough  GI:  Abdomen soft, non-tender, non-distended, normoactive bowel sounds, no masses  MSK:  No muscle atrophy, cyanosis, peripheral edema, full range of motion  Skin:  No rashes, ulcers, erythema  Neuro:  CNII-XII grossly intact. No gross motor/sensory deficits  Psych:  Alert and oriented to person, place, and time    Protective Sensation (w/ 10 gram monofilament):  Right: Intact  Left: Intact    Visual Inspection:  Normal -  Bilateral    Pedal Pulses:   Right: Present  Left: Present    Posterior tibialis:   Right:Present  Left: Present        ASSESSMENT & PLAN:      Hung was seen today for annual exam.     Diagnoses and all orders for this visit:     Polyp of colon, unspecified part of colon, unspecified type  Colonoscopy  Due in 10/2026     Screening for cancer  -     Diabetes mellitus without complication  -    -     Recommend continue diet modification  -      continue  The Amaryl 4 mg avoid Metformin due to renal insufficiency)  -     Return to clinic in 6  months for follow up.     Stage 2 moderate COPD by GOLD classification        -     Continue Albuterol inhaler prn  CT Chest Lung Screening Low Dose; next year-- 3/2020         Stage 3 chronic kidney disease  -    reviewed-- has benign renal cyst.      Personal history of nicotine dependence   -     CT Chest Lung Screening Low Dose     Multinodular non-toxic goiter  -     TSH; was normal         Discussed shingrix vaccine

## 2020-07-17 DIAGNOSIS — Z71.89 COMPLEX CARE COORDINATION: ICD-10-CM

## 2020-11-05 ENCOUNTER — TELEPHONE (OUTPATIENT)
Dept: INTERNAL MEDICINE | Facility: CLINIC | Age: 70
End: 2020-11-05

## 2020-11-11 ENCOUNTER — LAB VISIT (OUTPATIENT)
Dept: LAB | Facility: HOSPITAL | Age: 70
End: 2020-11-11
Attending: INTERNAL MEDICINE
Payer: MEDICARE

## 2020-11-11 DIAGNOSIS — N18.30 TYPE 2 DIABETES MELLITUS WITH STAGE 3 CHRONIC KIDNEY DISEASE, WITHOUT LONG-TERM CURRENT USE OF INSULIN: ICD-10-CM

## 2020-11-11 DIAGNOSIS — E04.2 MULTINODULAR NON-TOXIC GOITER: ICD-10-CM

## 2020-11-11 DIAGNOSIS — E11.22 TYPE 2 DIABETES MELLITUS WITH STAGE 3 CHRONIC KIDNEY DISEASE, WITHOUT LONG-TERM CURRENT USE OF INSULIN: ICD-10-CM

## 2020-11-11 LAB
ALBUMIN SERPL BCP-MCNC: 4 G/DL (ref 3.5–5.2)
ALP SERPL-CCNC: 53 U/L (ref 55–135)
ALT SERPL W/O P-5'-P-CCNC: 39 U/L (ref 10–44)
ANION GAP SERPL CALC-SCNC: 6 MMOL/L (ref 8–16)
AST SERPL-CCNC: 32 U/L (ref 10–40)
BILIRUB SERPL-MCNC: 1.2 MG/DL (ref 0.1–1)
BUN SERPL-MCNC: 22 MG/DL (ref 8–23)
CALCIUM SERPL-MCNC: 10 MG/DL (ref 8.7–10.5)
CHLORIDE SERPL-SCNC: 106 MMOL/L (ref 95–110)
CO2 SERPL-SCNC: 27 MMOL/L (ref 23–29)
CREAT SERPL-MCNC: 1.6 MG/DL (ref 0.5–1.4)
EST. GFR  (AFRICAN AMERICAN): 49.4 ML/MIN/1.73 M^2
EST. GFR  (NON AFRICAN AMERICAN): 42.7 ML/MIN/1.73 M^2
ESTIMATED AVG GLUCOSE: 186 MG/DL (ref 68–131)
GLUCOSE SERPL-MCNC: 178 MG/DL (ref 70–110)
HBA1C MFR BLD HPLC: 8.1 % (ref 4–5.6)
POTASSIUM SERPL-SCNC: 4.4 MMOL/L (ref 3.5–5.1)
PROT SERPL-MCNC: 7.1 G/DL (ref 6–8.4)
SODIUM SERPL-SCNC: 139 MMOL/L (ref 136–145)
TSH SERPL DL<=0.005 MIU/L-ACNC: 1.19 UIU/ML (ref 0.4–4)

## 2020-11-11 PROCEDURE — 84443 ASSAY THYROID STIM HORMONE: CPT

## 2020-11-11 PROCEDURE — 83036 HEMOGLOBIN GLYCOSYLATED A1C: CPT

## 2020-11-11 PROCEDURE — 36415 COLL VENOUS BLD VENIPUNCTURE: CPT | Mod: PN

## 2020-11-11 PROCEDURE — 80053 COMPREHEN METABOLIC PANEL: CPT

## 2020-11-20 ENCOUNTER — OFFICE VISIT (OUTPATIENT)
Dept: INTERNAL MEDICINE | Facility: CLINIC | Age: 70
End: 2020-11-20
Payer: MEDICARE

## 2020-11-20 VITALS
HEIGHT: 71 IN | DIASTOLIC BLOOD PRESSURE: 76 MMHG | OXYGEN SATURATION: 95 % | HEART RATE: 65 BPM | WEIGHT: 189.81 LBS | BODY MASS INDEX: 26.57 KG/M2 | SYSTOLIC BLOOD PRESSURE: 130 MMHG

## 2020-11-20 DIAGNOSIS — N18.31 TYPE 2 DIABETES MELLITUS WITH STAGE 3A CHRONIC KIDNEY DISEASE, WITHOUT LONG-TERM CURRENT USE OF INSULIN: ICD-10-CM

## 2020-11-20 DIAGNOSIS — K63.5 POLYP OF COLON, UNSPECIFIED PART OF COLON, UNSPECIFIED TYPE: ICD-10-CM

## 2020-11-20 DIAGNOSIS — Z87.891 H/O TOBACCO USE, PRESENTING HAZARDS TO HEALTH: ICD-10-CM

## 2020-11-20 DIAGNOSIS — J44.9 STAGE 2 MODERATE COPD BY GOLD CLASSIFICATION: ICD-10-CM

## 2020-11-20 DIAGNOSIS — E04.2 MULTINODULAR NON-TOXIC GOITER: ICD-10-CM

## 2020-11-20 DIAGNOSIS — E11.22 TYPE 2 DIABETES MELLITUS WITH STAGE 3A CHRONIC KIDNEY DISEASE, WITHOUT LONG-TERM CURRENT USE OF INSULIN: ICD-10-CM

## 2020-11-20 DIAGNOSIS — N18.31 STAGE 3A CHRONIC KIDNEY DISEASE: Primary | ICD-10-CM

## 2020-11-20 PROCEDURE — 99213 PR OFFICE/OUTPT VISIT, EST, LEVL III, 20-29 MIN: ICD-10-PCS | Mod: S$PBB,,, | Performed by: INTERNAL MEDICINE

## 2020-11-20 PROCEDURE — 99999 PR PBB SHADOW E&M-EST. PATIENT-LVL III: ICD-10-PCS | Mod: PBBFAC,,, | Performed by: INTERNAL MEDICINE

## 2020-11-20 PROCEDURE — 99999 PR PBB SHADOW E&M-EST. PATIENT-LVL III: CPT | Mod: PBBFAC,,, | Performed by: INTERNAL MEDICINE

## 2020-11-20 PROCEDURE — 99213 OFFICE O/P EST LOW 20 MIN: CPT | Mod: PBBFAC | Performed by: INTERNAL MEDICINE

## 2020-11-20 PROCEDURE — 99213 OFFICE O/P EST LOW 20 MIN: CPT | Mod: S$PBB,,, | Performed by: INTERNAL MEDICINE

## 2020-11-20 RX ORDER — INFLUENZA A VIRUS A/MICHIGAN/45/2015 X-275 (H1N1) ANTIGEN (FORMALDEHYDE INACTIVATED), INFLUENZA A VIRUS A/SINGAPORE/INFIMH-16-0019/2016 IVR-186 (H3N2) ANTIGEN (FORMALDEHYDE INACTIVATED), INFLUENZA B VIRUS B/PHUKET/3073/2013 ANTIGEN (FORMALDEHYDE INACTIVATED), AND INFLUENZA B VIRUS B/MARYLAND/15/2016 BX-69A ANTIGEN (FORMALDEHYDE INACTIVATED) 60; 60; 60; 60 UG/.7ML; UG/.7ML; UG/.7ML; UG/.7ML
INJECTION, SUSPENSION INTRAMUSCULAR
COMMUNITY
Start: 2020-09-02 | End: 2020-11-20

## 2020-11-20 RX ORDER — METFORMIN HYDROCHLORIDE 500 MG/1
500 TABLET ORAL
Qty: 90 TABLET | Refills: 3 | Status: SHIPPED | OUTPATIENT
Start: 2020-11-20 | End: 2021-03-15

## 2020-11-20 NOTE — PROGRESS NOTES
"  Mr. Hung Sorto is a 70 y.o. male here for an annual physical     1. DM: Patient now with HgBA1C 8.1 (11/2020) , currently controlling diabetes with glimepiride he was on Januvia, but it was too expensive .  . Reports eating oat meal, salad and sometimes indulging with fries. He denies polyuria, polydipsia and increased thirst. He denies any recent changes in vision, he also denies any numbness or tingling.He has lost 1  since last time.  He is still working during the pandemic doing yard.       2. CKD: Patient has CKDIII, he remains stable Cr 1.6 (11/2020) with GFR 50  ml/min.       3. COPD: Patient has COPD with a 50 pk year smoking history. He quit in 2017. He has an albuterol inhaler which he rarely uses. He denies any wheezing, SOB or dyspnea on exertion. 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  He did his Lung screening CT  In 5/2020.       4. HLD: He is currently on Atorvastatin 20mg. Current LDL 88, HDL 34.     5. Glaucoma: Patient follows with Opthalmology frequently 9/2020. He denies any vision loss or blurry vision.           He otherwise has no other complaints today.     Review of Systems:   He has lost 1  # since last visit.   Constitutional: no fever or chills  Eyes: no visual changes  ENT: no nasal congestion or sore throat  Respiratory: no cough or shortness of breath  Cardiovascular: no chest pain or palpitations  Gastrointestinal: no nausea or vomiting, no abdominal pain or change in bowel habits  Genitourinary: no hematuria or dysuria  Musculoskeletal: no arthralgias or myalgias  Neurological: no seizures or tremors  Skin: No rashes      Review of patient's allergies indicates:  No Known Allergies     OBJECTIVE:   Vital Signs:        /76 (BP Location: Right arm, Patient Position: Sitting, BP Method: Medium (Manual))   Pulse 65   Ht 5' 11" (1.803 m)   Wt 86.1 kg (189 lb 13.1 oz)   SpO2 95%   BMI 26.47 kg/m²           Physical Exam:   General:  Well " developed, well nourished, no acute distress  Eyes: look a little red-- he has glaucoma and he says the eye drops cause this.    HEENT:  Normocephalic, atraumatic, PERRL, EOMI, clear sclera, throat clear without erythema or exudates  Neck: No carotid bruits. No thyromegaly.   CVS:  RRR, S1 and S2 normal, no murmurs, rubs, gallops  Resp:  Lungs clear to auscultation, no wheezes, rales, rhonchi, cough  GI:  Abdomen soft, non-tender, non-distended, normoactive bowel sounds, no masses  MSK:  No muscle atrophy, cyanosis, peripheral edema, full range of motion  Skin:  No rashes, ulcers, erythema  Neuro:  CNII-XII grossly intact. No gross motor/sensory deficits  Psych:  Alert and oriented to person, place, and time              ASSESSMENT & PLAN:      Hung was seen today for annual exam.     Diagnoses and all orders for this visit:     Polyp of colon, unspecified part of colon, unspecified type  Colonoscopy  Due in 10/2026     Screening for cancer  -     Diabetes mellitus without complication  -    -     Recommend continue diet modification  -      continue  The Amaryl 4 mg -- willl start metformin 500 mg a day and follow up in 4 months  And monitor renal insufficiency   -     Return to clinic in 6  months for follow up.     Stage 2 moderate COPD by GOLD classification        -     Continue Albuterol inhaler prn  CT Chest Lung Screening Low Dose; next year-- 5/2021         Stage 3 chronic kidney disease  -    reviewed-- has benign renal cyst.

## 2021-03-05 ENCOUNTER — LAB VISIT (OUTPATIENT)
Dept: LAB | Facility: HOSPITAL | Age: 71
End: 2021-03-05
Attending: INTERNAL MEDICINE
Payer: MEDICARE

## 2021-03-05 DIAGNOSIS — E11.22 TYPE 2 DIABETES MELLITUS WITH STAGE 3A CHRONIC KIDNEY DISEASE, WITHOUT LONG-TERM CURRENT USE OF INSULIN: ICD-10-CM

## 2021-03-05 DIAGNOSIS — E04.2 MULTINODULAR NON-TOXIC GOITER: ICD-10-CM

## 2021-03-05 DIAGNOSIS — N18.31 TYPE 2 DIABETES MELLITUS WITH STAGE 3A CHRONIC KIDNEY DISEASE, WITHOUT LONG-TERM CURRENT USE OF INSULIN: ICD-10-CM

## 2021-03-05 LAB
ALBUMIN SERPL BCP-MCNC: 3.9 G/DL (ref 3.5–5.2)
ALP SERPL-CCNC: 61 U/L (ref 55–135)
ALT SERPL W/O P-5'-P-CCNC: 25 U/L (ref 10–44)
ANION GAP SERPL CALC-SCNC: 7 MMOL/L (ref 8–16)
AST SERPL-CCNC: 23 U/L (ref 10–40)
BILIRUB SERPL-MCNC: 0.8 MG/DL (ref 0.1–1)
BUN SERPL-MCNC: 23 MG/DL (ref 8–23)
CALCIUM SERPL-MCNC: 9.9 MG/DL (ref 8.7–10.5)
CHLORIDE SERPL-SCNC: 108 MMOL/L (ref 95–110)
CO2 SERPL-SCNC: 26 MMOL/L (ref 23–29)
CREAT SERPL-MCNC: 1.5 MG/DL (ref 0.5–1.4)
EST. GFR  (AFRICAN AMERICAN): 53.4 ML/MIN/1.73 M^2
EST. GFR  (NON AFRICAN AMERICAN): 46.2 ML/MIN/1.73 M^2
GLUCOSE SERPL-MCNC: 176 MG/DL (ref 70–110)
POTASSIUM SERPL-SCNC: 4.2 MMOL/L (ref 3.5–5.1)
PROT SERPL-MCNC: 7.1 G/DL (ref 6–8.4)
SODIUM SERPL-SCNC: 141 MMOL/L (ref 136–145)
TSH SERPL DL<=0.005 MIU/L-ACNC: 0.97 UIU/ML (ref 0.4–4)

## 2021-03-05 PROCEDURE — 80053 COMPREHEN METABOLIC PANEL: CPT | Performed by: INTERNAL MEDICINE

## 2021-03-05 PROCEDURE — 84443 ASSAY THYROID STIM HORMONE: CPT | Performed by: INTERNAL MEDICINE

## 2021-03-05 PROCEDURE — 83036 HEMOGLOBIN GLYCOSYLATED A1C: CPT | Performed by: INTERNAL MEDICINE

## 2021-03-05 PROCEDURE — 36415 COLL VENOUS BLD VENIPUNCTURE: CPT | Mod: PN | Performed by: INTERNAL MEDICINE

## 2021-03-06 LAB
ESTIMATED AVG GLUCOSE: 214 MG/DL (ref 68–131)
HBA1C MFR BLD: 9.1 % (ref 4–5.6)

## 2021-03-10 ENCOUNTER — TELEPHONE (OUTPATIENT)
Dept: ADMINISTRATIVE | Facility: OTHER | Age: 71
End: 2021-03-10

## 2021-03-10 DIAGNOSIS — N18.31 TYPE 2 DIABETES MELLITUS WITH STAGE 3A CHRONIC KIDNEY DISEASE, WITHOUT LONG-TERM CURRENT USE OF INSULIN: ICD-10-CM

## 2021-03-10 DIAGNOSIS — E11.22 TYPE 2 DIABETES MELLITUS WITH STAGE 3A CHRONIC KIDNEY DISEASE, WITHOUT LONG-TERM CURRENT USE OF INSULIN: ICD-10-CM

## 2021-03-15 ENCOUNTER — OFFICE VISIT (OUTPATIENT)
Dept: INTERNAL MEDICINE | Facility: CLINIC | Age: 71
End: 2021-03-15
Payer: MEDICARE

## 2021-03-15 ENCOUNTER — HOSPITAL ENCOUNTER (OUTPATIENT)
Dept: RADIOLOGY | Facility: HOSPITAL | Age: 71
Discharge: HOME | End: 2021-03-15
Attending: INTERNAL MEDICINE
Payer: MEDICARE

## 2021-03-15 ENCOUNTER — PATIENT MESSAGE (OUTPATIENT)
Dept: INTERNAL MEDICINE | Facility: CLINIC | Age: 71
End: 2021-03-15

## 2021-03-15 VITALS
TEMPERATURE: 98 F | SYSTOLIC BLOOD PRESSURE: 145 MMHG | BODY MASS INDEX: 26.15 KG/M2 | WEIGHT: 186.75 LBS | HEIGHT: 71 IN | HEART RATE: 58 BPM | DIASTOLIC BLOOD PRESSURE: 80 MMHG | OXYGEN SATURATION: 98 %

## 2021-03-15 DIAGNOSIS — N18.31 STAGE 3A CHRONIC KIDNEY DISEASE: ICD-10-CM

## 2021-03-15 DIAGNOSIS — E21.3 HYPERPARATHYROIDISM: ICD-10-CM

## 2021-03-15 DIAGNOSIS — J44.9 STAGE 2 MODERATE COPD BY GOLD CLASSIFICATION: ICD-10-CM

## 2021-03-15 DIAGNOSIS — E04.2 MULTINODULAR NON-TOXIC GOITER: Primary | ICD-10-CM

## 2021-03-15 DIAGNOSIS — N18.31 TYPE 2 DIABETES MELLITUS WITH STAGE 3A CHRONIC KIDNEY DISEASE, WITHOUT LONG-TERM CURRENT USE OF INSULIN: ICD-10-CM

## 2021-03-15 DIAGNOSIS — E11.22 TYPE 2 DIABETES MELLITUS WITH STAGE 3A CHRONIC KIDNEY DISEASE, WITHOUT LONG-TERM CURRENT USE OF INSULIN: ICD-10-CM

## 2021-03-15 DIAGNOSIS — E04.2 MULTINODULAR NON-TOXIC GOITER: ICD-10-CM

## 2021-03-15 DIAGNOSIS — R03.0 ELEVATED BP WITHOUT DIAGNOSIS OF HYPERTENSION: ICD-10-CM

## 2021-03-15 PROCEDURE — 99213 OFFICE O/P EST LOW 20 MIN: CPT | Mod: PBBFAC,25 | Performed by: INTERNAL MEDICINE

## 2021-03-15 PROCEDURE — 76536 US EXAM OF HEAD AND NECK: CPT | Mod: 26,,, | Performed by: RADIOLOGY

## 2021-03-15 PROCEDURE — 99999 PR PBB SHADOW E&M-EST. PATIENT-LVL III: ICD-10-PCS | Mod: PBBFAC,,, | Performed by: INTERNAL MEDICINE

## 2021-03-15 PROCEDURE — 76536 US SOFT TISSUE HEAD NECK THYROID: ICD-10-PCS | Mod: 26,,, | Performed by: RADIOLOGY

## 2021-03-15 PROCEDURE — 99999 PR PBB SHADOW E&M-EST. PATIENT-LVL III: CPT | Mod: PBBFAC,,, | Performed by: INTERNAL MEDICINE

## 2021-03-15 PROCEDURE — 76536 US EXAM OF HEAD AND NECK: CPT | Mod: TC

## 2021-03-15 PROCEDURE — 99214 OFFICE O/P EST MOD 30 MIN: CPT | Mod: S$PBB,,, | Performed by: INTERNAL MEDICINE

## 2021-03-15 PROCEDURE — 99214 PR OFFICE/OUTPT VISIT, EST, LEVL IV, 30-39 MIN: ICD-10-PCS | Mod: S$PBB,,, | Performed by: INTERNAL MEDICINE

## 2021-03-15 RX ORDER — ATORVASTATIN CALCIUM 20 MG/1
20 TABLET, FILM COATED ORAL DAILY
Qty: 90 TABLET | Refills: 3 | Status: SHIPPED | OUTPATIENT
Start: 2021-03-15 | End: 2021-12-13 | Stop reason: SDUPTHER

## 2021-03-15 RX ORDER — METFORMIN HYDROCHLORIDE 500 MG/1
1000 TABLET ORAL
Qty: 180 TABLET | Refills: 3 | Status: SHIPPED | OUTPATIENT
Start: 2021-03-15 | End: 2021-12-13 | Stop reason: SDUPTHER

## 2021-03-15 RX ORDER — GLIMEPIRIDE 4 MG/1
4 TABLET ORAL
Qty: 90 TABLET | Refills: 3 | Status: SHIPPED | OUTPATIENT
Start: 2021-03-15 | End: 2021-12-13 | Stop reason: SDUPTHER

## 2021-03-15 RX ORDER — GLIMEPIRIDE 4 MG/1
4 TABLET ORAL
Qty: 90 TABLET | Refills: 3 | Status: SHIPPED | OUTPATIENT
Start: 2021-03-15 | End: 2021-03-15 | Stop reason: SDUPTHER

## 2021-03-15 RX ORDER — METFORMIN HYDROCHLORIDE 500 MG/1
1000 TABLET ORAL
Qty: 180 TABLET | Refills: 3 | Status: SHIPPED | OUTPATIENT
Start: 2021-03-15 | End: 2021-03-15 | Stop reason: SDUPTHER

## 2021-04-12 ENCOUNTER — PES CALL (OUTPATIENT)
Dept: ADMINISTRATIVE | Facility: CLINIC | Age: 71
End: 2021-04-12

## 2021-04-15 ENCOUNTER — CLINICAL SUPPORT (OUTPATIENT)
Dept: DIABETES | Facility: CLINIC | Age: 71
End: 2021-04-15
Payer: MEDICARE

## 2021-04-15 VITALS — WEIGHT: 183.19 LBS | BODY MASS INDEX: 25.65 KG/M2 | HEIGHT: 71 IN

## 2021-04-15 DIAGNOSIS — N18.31 TYPE 2 DIABETES MELLITUS WITH STAGE 3A CHRONIC KIDNEY DISEASE, WITHOUT LONG-TERM CURRENT USE OF INSULIN: ICD-10-CM

## 2021-04-15 DIAGNOSIS — E11.69 TYPE 2 DIABETES MELLITUS WITH OTHER SPECIFIED COMPLICATION, UNSPECIFIED WHETHER LONG TERM INSULIN USE: Primary | ICD-10-CM

## 2021-04-15 DIAGNOSIS — E11.22 TYPE 2 DIABETES MELLITUS WITH STAGE 3A CHRONIC KIDNEY DISEASE, WITHOUT LONG-TERM CURRENT USE OF INSULIN: ICD-10-CM

## 2021-04-15 PROCEDURE — 99999 PR PBB SHADOW E&M-EST. PATIENT-LVL III: CPT | Mod: PBBFAC,,,

## 2021-04-15 PROCEDURE — 99999 PR PBB SHADOW E&M-EST. PATIENT-LVL III: ICD-10-PCS | Mod: PBBFAC,,,

## 2021-04-15 PROCEDURE — 99213 OFFICE O/P EST LOW 20 MIN: CPT | Mod: PBBFAC

## 2021-04-15 PROCEDURE — G0108 DIAB MANAGE TRN  PER INDIV: HCPCS | Mod: PBBFAC

## 2021-05-10 ENCOUNTER — LAB VISIT (OUTPATIENT)
Dept: LAB | Facility: HOSPITAL | Age: 71
End: 2021-05-10
Attending: INTERNAL MEDICINE
Payer: MEDICARE

## 2021-05-10 DIAGNOSIS — E11.22 TYPE 2 DIABETES MELLITUS WITH STAGE 3A CHRONIC KIDNEY DISEASE, WITHOUT LONG-TERM CURRENT USE OF INSULIN: ICD-10-CM

## 2021-05-10 DIAGNOSIS — N18.31 TYPE 2 DIABETES MELLITUS WITH STAGE 3A CHRONIC KIDNEY DISEASE, WITHOUT LONG-TERM CURRENT USE OF INSULIN: ICD-10-CM

## 2021-05-10 DIAGNOSIS — J44.9 STAGE 2 MODERATE COPD BY GOLD CLASSIFICATION: ICD-10-CM

## 2021-05-10 LAB
ALBUMIN SERPL BCP-MCNC: 3.7 G/DL (ref 3.5–5.2)
ALP SERPL-CCNC: 45 U/L (ref 55–135)
ALT SERPL W/O P-5'-P-CCNC: 20 U/L (ref 10–44)
ANION GAP SERPL CALC-SCNC: 4 MMOL/L (ref 8–16)
AST SERPL-CCNC: 21 U/L (ref 10–40)
BILIRUB SERPL-MCNC: 1.1 MG/DL (ref 0.1–1)
BUN SERPL-MCNC: 20 MG/DL (ref 8–23)
CALCIUM SERPL-MCNC: 9.8 MG/DL (ref 8.7–10.5)
CHLORIDE SERPL-SCNC: 111 MMOL/L (ref 95–110)
CHOLEST SERPL-MCNC: 79 MG/DL (ref 120–199)
CHOLEST/HDLC SERPL: 2.3 {RATIO} (ref 2–5)
CO2 SERPL-SCNC: 27 MMOL/L (ref 23–29)
CREAT SERPL-MCNC: 1.6 MG/DL (ref 0.5–1.4)
EST. GFR  (AFRICAN AMERICAN): 49.4 ML/MIN/1.73 M^2
EST. GFR  (NON AFRICAN AMERICAN): 42.7 ML/MIN/1.73 M^2
ESTIMATED AVG GLUCOSE: 157 MG/DL (ref 68–131)
GLUCOSE SERPL-MCNC: 135 MG/DL (ref 70–110)
HBA1C MFR BLD: 7.1 % (ref 4–5.6)
HDLC SERPL-MCNC: 34 MG/DL (ref 40–75)
HDLC SERPL: 43 % (ref 20–50)
LDLC SERPL CALC-MCNC: 36.8 MG/DL (ref 63–159)
NONHDLC SERPL-MCNC: 45 MG/DL
POTASSIUM SERPL-SCNC: 4 MMOL/L (ref 3.5–5.1)
PROT SERPL-MCNC: 6.8 G/DL (ref 6–8.4)
SODIUM SERPL-SCNC: 142 MMOL/L (ref 136–145)
TRIGL SERPL-MCNC: 41 MG/DL (ref 30–150)

## 2021-05-10 PROCEDURE — 36415 COLL VENOUS BLD VENIPUNCTURE: CPT | Mod: PN | Performed by: INTERNAL MEDICINE

## 2021-05-10 PROCEDURE — 80061 LIPID PANEL: CPT | Performed by: INTERNAL MEDICINE

## 2021-05-10 PROCEDURE — 83036 HEMOGLOBIN GLYCOSYLATED A1C: CPT | Performed by: INTERNAL MEDICINE

## 2021-05-10 PROCEDURE — 80053 COMPREHEN METABOLIC PANEL: CPT | Performed by: INTERNAL MEDICINE

## 2021-05-13 ENCOUNTER — CLINICAL SUPPORT (OUTPATIENT)
Dept: DIABETES | Facility: CLINIC | Age: 71
End: 2021-05-13
Payer: MEDICARE

## 2021-05-13 VITALS — WEIGHT: 184.31 LBS | HEIGHT: 71 IN | BODY MASS INDEX: 25.8 KG/M2

## 2021-05-13 DIAGNOSIS — E11.69 TYPE 2 DIABETES MELLITUS WITH OTHER SPECIFIED COMPLICATION, UNSPECIFIED WHETHER LONG TERM INSULIN USE: ICD-10-CM

## 2021-05-13 DIAGNOSIS — E11.69 TYPE 2 DIABETES MELLITUS WITH OTHER SPECIFIED COMPLICATION, WITHOUT LONG-TERM CURRENT USE OF INSULIN: Primary | ICD-10-CM

## 2021-05-13 PROCEDURE — 99999 PR PBB SHADOW E&M-EST. PATIENT-LVL III: CPT | Mod: PBBFAC,,,

## 2021-05-13 PROCEDURE — 99213 OFFICE O/P EST LOW 20 MIN: CPT | Mod: PBBFAC

## 2021-05-13 PROCEDURE — G0108 DIAB MANAGE TRN  PER INDIV: HCPCS | Mod: PBBFAC

## 2021-05-13 PROCEDURE — 99999 PR PBB SHADOW E&M-EST. PATIENT-LVL III: ICD-10-PCS | Mod: PBBFAC,,,

## 2021-05-14 RX ORDER — LANCETS
1 EACH MISCELLANEOUS 2 TIMES DAILY
Qty: 200 EACH | Refills: 3 | Status: SHIPPED | OUTPATIENT
Start: 2021-05-14 | End: 2022-08-01

## 2021-05-17 ENCOUNTER — OFFICE VISIT (OUTPATIENT)
Dept: INTERNAL MEDICINE | Facility: CLINIC | Age: 71
End: 2021-05-17
Payer: MEDICARE

## 2021-05-17 VITALS
DIASTOLIC BLOOD PRESSURE: 80 MMHG | HEIGHT: 71 IN | WEIGHT: 184.06 LBS | SYSTOLIC BLOOD PRESSURE: 132 MMHG | OXYGEN SATURATION: 99 % | HEART RATE: 53 BPM | BODY MASS INDEX: 25.77 KG/M2

## 2021-05-17 DIAGNOSIS — N18.31 TYPE 2 DIABETES MELLITUS WITH STAGE 3A CHRONIC KIDNEY DISEASE, WITHOUT LONG-TERM CURRENT USE OF INSULIN: ICD-10-CM

## 2021-05-17 DIAGNOSIS — F17.211 NICOTINE DEPENDENCE, CIGARETTES, IN REMISSION: ICD-10-CM

## 2021-05-17 DIAGNOSIS — J44.9 STAGE 2 MODERATE COPD BY GOLD CLASSIFICATION: ICD-10-CM

## 2021-05-17 DIAGNOSIS — E11.22 TYPE 2 DIABETES MELLITUS WITH STAGE 3A CHRONIC KIDNEY DISEASE, WITHOUT LONG-TERM CURRENT USE OF INSULIN: ICD-10-CM

## 2021-05-17 DIAGNOSIS — N18.31 STAGE 3A CHRONIC KIDNEY DISEASE: ICD-10-CM

## 2021-05-17 DIAGNOSIS — Z87.891 H/O TOBACCO USE, PRESENTING HAZARDS TO HEALTH: ICD-10-CM

## 2021-05-17 DIAGNOSIS — Z12.2 ENCOUNTER FOR SCREENING FOR MALIGNANT NEOPLASM OF RESPIRATORY ORGANS: ICD-10-CM

## 2021-05-17 DIAGNOSIS — Z12.5 SCREENING PSA (PROSTATE SPECIFIC ANTIGEN): ICD-10-CM

## 2021-05-17 DIAGNOSIS — K63.5 POLYP OF COLON, UNSPECIFIED PART OF COLON, UNSPECIFIED TYPE: ICD-10-CM

## 2021-05-17 PROCEDURE — 99214 OFFICE O/P EST MOD 30 MIN: CPT | Mod: S$PBB,,, | Performed by: INTERNAL MEDICINE

## 2021-05-17 PROCEDURE — 99213 OFFICE O/P EST LOW 20 MIN: CPT | Mod: PBBFAC | Performed by: INTERNAL MEDICINE

## 2021-05-17 PROCEDURE — 99214 PR OFFICE/OUTPT VISIT, EST, LEVL IV, 30-39 MIN: ICD-10-PCS | Mod: S$PBB,,, | Performed by: INTERNAL MEDICINE

## 2021-05-17 PROCEDURE — 99999 PR PBB SHADOW E&M-EST. PATIENT-LVL III: ICD-10-PCS | Mod: PBBFAC,,, | Performed by: INTERNAL MEDICINE

## 2021-05-17 PROCEDURE — 99999 PR PBB SHADOW E&M-EST. PATIENT-LVL III: CPT | Mod: PBBFAC,,, | Performed by: INTERNAL MEDICINE

## 2021-05-24 ENCOUNTER — HOSPITAL ENCOUNTER (OUTPATIENT)
Dept: RADIOLOGY | Facility: HOSPITAL | Age: 71
Discharge: HOME | End: 2021-05-24
Attending: INTERNAL MEDICINE
Payer: MEDICARE

## 2021-05-24 DIAGNOSIS — F17.211 NICOTINE DEPENDENCE, CIGARETTES, IN REMISSION: ICD-10-CM

## 2021-05-24 DIAGNOSIS — Z12.2 ENCOUNTER FOR SCREENING FOR MALIGNANT NEOPLASM OF RESPIRATORY ORGANS: ICD-10-CM

## 2021-05-24 PROCEDURE — 71271 CT THORAX LUNG CANCER SCR C-: CPT | Mod: TC

## 2021-05-24 PROCEDURE — 71271 CT THORAX LUNG CANCER SCR C-: CPT | Mod: 26,,, | Performed by: RADIOLOGY

## 2021-05-24 PROCEDURE — 71271 CT CHEST LUNG SCREENING LOW DOSE: ICD-10-PCS | Mod: 26,,, | Performed by: RADIOLOGY

## 2021-07-28 DIAGNOSIS — E11.9 TYPE 2 DIABETES MELLITUS WITHOUT COMPLICATION: ICD-10-CM

## 2021-08-10 ENCOUNTER — PES CALL (OUTPATIENT)
Dept: ADMINISTRATIVE | Facility: CLINIC | Age: 71
End: 2021-08-10

## 2021-09-22 ENCOUNTER — TELEPHONE (OUTPATIENT)
Dept: INTERNAL MEDICINE | Facility: CLINIC | Age: 71
End: 2021-09-22

## 2021-10-04 ENCOUNTER — PATIENT MESSAGE (OUTPATIENT)
Dept: ADMINISTRATIVE | Facility: HOSPITAL | Age: 71
End: 2021-10-04

## 2021-12-06 ENCOUNTER — LAB VISIT (OUTPATIENT)
Dept: LAB | Facility: HOSPITAL | Age: 71
End: 2021-12-06
Attending: INTERNAL MEDICINE
Payer: MEDICARE

## 2021-12-06 DIAGNOSIS — E11.22 TYPE 2 DIABETES MELLITUS WITH STAGE 3A CHRONIC KIDNEY DISEASE, WITHOUT LONG-TERM CURRENT USE OF INSULIN: ICD-10-CM

## 2021-12-06 DIAGNOSIS — N18.31 TYPE 2 DIABETES MELLITUS WITH STAGE 3A CHRONIC KIDNEY DISEASE, WITHOUT LONG-TERM CURRENT USE OF INSULIN: ICD-10-CM

## 2021-12-06 DIAGNOSIS — Z12.5 SCREENING PSA (PROSTATE SPECIFIC ANTIGEN): ICD-10-CM

## 2021-12-06 LAB
ALBUMIN SERPL BCP-MCNC: 4 G/DL (ref 3.5–5.2)
ALP SERPL-CCNC: 44 U/L (ref 55–135)
ALT SERPL W/O P-5'-P-CCNC: 17 U/L (ref 10–44)
ANION GAP SERPL CALC-SCNC: 9 MMOL/L (ref 8–16)
AST SERPL-CCNC: 21 U/L (ref 10–40)
BILIRUB SERPL-MCNC: 1.7 MG/DL (ref 0.1–1)
BUN SERPL-MCNC: 20 MG/DL (ref 8–23)
CALCIUM SERPL-MCNC: 10.2 MG/DL (ref 8.7–10.5)
CHLORIDE SERPL-SCNC: 108 MMOL/L (ref 95–110)
CHOLEST SERPL-MCNC: 114 MG/DL (ref 120–199)
CHOLEST/HDLC SERPL: 2.5 {RATIO} (ref 2–5)
CO2 SERPL-SCNC: 25 MMOL/L (ref 23–29)
COMPLEXED PSA SERPL-MCNC: 1.3 NG/ML (ref 0–4)
CREAT SERPL-MCNC: 1.4 MG/DL (ref 0.5–1.4)
EST. GFR  (AFRICAN AMERICAN): 57.6 ML/MIN/1.73 M^2
EST. GFR  (NON AFRICAN AMERICAN): 49.8 ML/MIN/1.73 M^2
GLUCOSE SERPL-MCNC: 114 MG/DL (ref 70–110)
HDLC SERPL-MCNC: 46 MG/DL (ref 40–75)
HDLC SERPL: 40.4 % (ref 20–50)
LDLC SERPL CALC-MCNC: 57.8 MG/DL (ref 63–159)
NONHDLC SERPL-MCNC: 68 MG/DL
POTASSIUM SERPL-SCNC: 4 MMOL/L (ref 3.5–5.1)
PROT SERPL-MCNC: 6.9 G/DL (ref 6–8.4)
SODIUM SERPL-SCNC: 142 MMOL/L (ref 136–145)
TRIGL SERPL-MCNC: 51 MG/DL (ref 30–150)

## 2021-12-06 PROCEDURE — 36415 COLL VENOUS BLD VENIPUNCTURE: CPT | Mod: PN | Performed by: INTERNAL MEDICINE

## 2021-12-06 PROCEDURE — 84153 ASSAY OF PSA TOTAL: CPT | Performed by: INTERNAL MEDICINE

## 2021-12-06 PROCEDURE — 80053 COMPREHEN METABOLIC PANEL: CPT | Performed by: INTERNAL MEDICINE

## 2021-12-06 PROCEDURE — 80061 LIPID PANEL: CPT | Performed by: INTERNAL MEDICINE

## 2021-12-10 ENCOUNTER — PES CALL (OUTPATIENT)
Dept: ADMINISTRATIVE | Facility: CLINIC | Age: 71
End: 2021-12-10
Payer: MEDICARE

## 2021-12-13 ENCOUNTER — OFFICE VISIT (OUTPATIENT)
Dept: INTERNAL MEDICINE | Facility: CLINIC | Age: 71
End: 2021-12-13
Payer: MEDICARE

## 2021-12-13 VITALS
SYSTOLIC BLOOD PRESSURE: 134 MMHG | OXYGEN SATURATION: 98 % | BODY MASS INDEX: 24.81 KG/M2 | DIASTOLIC BLOOD PRESSURE: 70 MMHG | WEIGHT: 177.25 LBS | HEIGHT: 71 IN | HEART RATE: 56 BPM

## 2021-12-13 VITALS
BODY MASS INDEX: 24.81 KG/M2 | SYSTOLIC BLOOD PRESSURE: 134 MMHG | OXYGEN SATURATION: 98 % | DIASTOLIC BLOOD PRESSURE: 70 MMHG | WEIGHT: 177.25 LBS | HEART RATE: 56 BPM | HEIGHT: 71 IN

## 2021-12-13 DIAGNOSIS — N18.31 TYPE 2 DIABETES MELLITUS WITH STAGE 3A CHRONIC KIDNEY DISEASE, WITHOUT LONG-TERM CURRENT USE OF INSULIN: ICD-10-CM

## 2021-12-13 DIAGNOSIS — R79.9 ABNORMAL FINDING OF BLOOD CHEMISTRY, UNSPECIFIED: ICD-10-CM

## 2021-12-13 DIAGNOSIS — N18.30 STAGE 3 CHRONIC KIDNEY DISEASE, UNSPECIFIED WHETHER STAGE 3A OR 3B CKD: ICD-10-CM

## 2021-12-13 DIAGNOSIS — E21.3 HYPERPARATHYROIDISM: ICD-10-CM

## 2021-12-13 DIAGNOSIS — E78.5 HYPERLIPIDEMIA, UNSPECIFIED HYPERLIPIDEMIA TYPE: ICD-10-CM

## 2021-12-13 DIAGNOSIS — E04.2 MULTINODULAR NON-TOXIC GOITER: ICD-10-CM

## 2021-12-13 DIAGNOSIS — J44.9 STAGE 2 MODERATE COPD BY GOLD CLASSIFICATION: ICD-10-CM

## 2021-12-13 DIAGNOSIS — E11.22 TYPE 2 DIABETES MELLITUS WITH STAGE 3A CHRONIC KIDNEY DISEASE, WITHOUT LONG-TERM CURRENT USE OF INSULIN: ICD-10-CM

## 2021-12-13 DIAGNOSIS — K63.5 POLYP OF COLON, UNSPECIFIED PART OF COLON, UNSPECIFIED TYPE: ICD-10-CM

## 2021-12-13 DIAGNOSIS — Z87.891 H/O TOBACCO USE, PRESENTING HAZARDS TO HEALTH: ICD-10-CM

## 2021-12-13 DIAGNOSIS — R06.02 SOB (SHORTNESS OF BREATH): ICD-10-CM

## 2021-12-13 DIAGNOSIS — N18.31 STAGE 3A CHRONIC KIDNEY DISEASE: Primary | ICD-10-CM

## 2021-12-13 DIAGNOSIS — Z00.00 ENCOUNTER FOR PREVENTIVE HEALTH EXAMINATION: Primary | ICD-10-CM

## 2021-12-13 LAB
ALBUMIN/CREAT UR: NORMAL UG/MG (ref 0–30)
CREAT UR-MCNC: 62 MG/DL (ref 23–375)
MICROALBUMIN UR DL<=1MG/L-MCNC: <5 UG/ML

## 2021-12-13 PROCEDURE — 99214 OFFICE O/P EST MOD 30 MIN: CPT | Mod: S$PBB,,, | Performed by: INTERNAL MEDICINE

## 2021-12-13 PROCEDURE — 99213 OFFICE O/P EST LOW 20 MIN: CPT | Mod: PBBFAC,27 | Performed by: INTERNAL MEDICINE

## 2021-12-13 PROCEDURE — 99999 PR PBB SHADOW E&M-EST. PATIENT-LVL III: CPT | Mod: PBBFAC,,, | Performed by: INTERNAL MEDICINE

## 2021-12-13 PROCEDURE — 82570 ASSAY OF URINE CREATININE: CPT | Performed by: INTERNAL MEDICINE

## 2021-12-13 PROCEDURE — 99215 OFFICE O/P EST HI 40 MIN: CPT | Mod: PBBFAC,27 | Performed by: NURSE PRACTITIONER

## 2021-12-13 PROCEDURE — 99999 PR PBB SHADOW E&M-EST. PATIENT-LVL III: ICD-10-PCS | Mod: PBBFAC,,, | Performed by: INTERNAL MEDICINE

## 2021-12-13 PROCEDURE — G0439 PPPS, SUBSEQ VISIT: HCPCS | Mod: ,,, | Performed by: NURSE PRACTITIONER

## 2021-12-13 PROCEDURE — G0439 PR MEDICARE ANNUAL WELLNESS SUBSEQUENT VISIT: ICD-10-PCS | Mod: ,,, | Performed by: NURSE PRACTITIONER

## 2021-12-13 PROCEDURE — 99999 PR PBB SHADOW E&M-EST. PATIENT-LVL V: ICD-10-PCS | Mod: PBBFAC,,, | Performed by: NURSE PRACTITIONER

## 2021-12-13 PROCEDURE — 99999 PR PBB SHADOW E&M-EST. PATIENT-LVL V: CPT | Mod: PBBFAC,,, | Performed by: NURSE PRACTITIONER

## 2021-12-13 PROCEDURE — 99214 PR OFFICE/OUTPT VISIT, EST, LEVL IV, 30-39 MIN: ICD-10-PCS | Mod: S$PBB,,, | Performed by: INTERNAL MEDICINE

## 2021-12-13 RX ORDER — ATORVASTATIN CALCIUM 20 MG/1
20 TABLET, FILM COATED ORAL DAILY
Qty: 90 TABLET | Refills: 3 | Status: SHIPPED | OUTPATIENT
Start: 2021-12-13 | End: 2023-01-02

## 2021-12-13 RX ORDER — GLIMEPIRIDE 4 MG/1
4 TABLET ORAL
Qty: 90 TABLET | Refills: 3 | Status: SHIPPED | OUTPATIENT
Start: 2021-12-13 | End: 2023-02-05 | Stop reason: SDUPTHER

## 2021-12-13 RX ORDER — ALBUTEROL SULFATE 90 UG/1
2 AEROSOL, METERED RESPIRATORY (INHALATION) EVERY 6 HOURS PRN
Qty: 18 G | Refills: 1 | Status: SHIPPED | OUTPATIENT
Start: 2021-12-13 | End: 2022-08-01

## 2021-12-13 RX ORDER — METFORMIN HYDROCHLORIDE 500 MG/1
1000 TABLET ORAL
Qty: 180 TABLET | Refills: 3 | Status: SHIPPED | OUTPATIENT
Start: 2021-12-13 | End: 2022-12-12

## 2022-02-23 ENCOUNTER — PATIENT MESSAGE (OUTPATIENT)
Dept: INTERNAL MEDICINE | Facility: CLINIC | Age: 72
End: 2022-02-23
Payer: MEDICARE

## 2022-02-25 ENCOUNTER — OFFICE VISIT (OUTPATIENT)
Dept: INTERNAL MEDICINE | Facility: CLINIC | Age: 72
End: 2022-02-25
Payer: MEDICARE

## 2022-02-25 VITALS
DIASTOLIC BLOOD PRESSURE: 68 MMHG | BODY MASS INDEX: 24.88 KG/M2 | HEART RATE: 81 BPM | HEIGHT: 71 IN | WEIGHT: 177.69 LBS | OXYGEN SATURATION: 99 % | RESPIRATION RATE: 16 BRPM | SYSTOLIC BLOOD PRESSURE: 121 MMHG

## 2022-02-25 DIAGNOSIS — L08.9 INFECTED CYST OF SKIN: Primary | ICD-10-CM

## 2022-02-25 DIAGNOSIS — L72.9 INFECTED CYST OF SKIN: Primary | ICD-10-CM

## 2022-02-25 PROCEDURE — 99213 PR OFFICE/OUTPT VISIT, EST, LEVL III, 20-29 MIN: ICD-10-PCS | Mod: S$PBB,,, | Performed by: PHYSICIAN ASSISTANT

## 2022-02-25 PROCEDURE — 99999 PR PBB SHADOW E&M-EST. PATIENT-LVL IV: CPT | Mod: PBBFAC,,, | Performed by: PHYSICIAN ASSISTANT

## 2022-02-25 PROCEDURE — 99999 PR PBB SHADOW E&M-EST. PATIENT-LVL IV: ICD-10-PCS | Mod: PBBFAC,,, | Performed by: PHYSICIAN ASSISTANT

## 2022-02-25 PROCEDURE — 99214 OFFICE O/P EST MOD 30 MIN: CPT | Mod: PBBFAC | Performed by: PHYSICIAN ASSISTANT

## 2022-02-25 PROCEDURE — 99213 OFFICE O/P EST LOW 20 MIN: CPT | Mod: S$PBB,,, | Performed by: PHYSICIAN ASSISTANT

## 2022-02-25 RX ORDER — HYDROCODONE BITARTRATE AND ACETAMINOPHEN 5; 325 MG/1; MG/1
1 TABLET ORAL EVERY 12 HOURS PRN
Qty: 12 TABLET | Refills: 0 | Status: SHIPPED | OUTPATIENT
Start: 2022-02-25 | End: 2022-08-01

## 2022-02-25 RX ORDER — DOXYCYCLINE 100 MG/1
100 CAPSULE ORAL EVERY 12 HOURS
Qty: 14 CAPSULE | Refills: 0 | Status: SHIPPED | OUTPATIENT
Start: 2022-02-25 | End: 2022-03-04 | Stop reason: SDUPTHER

## 2022-02-25 NOTE — PROGRESS NOTES
"Subjective:       Patient ID: Hung Sorto is a 71 y.o. male.    Chief Complaint: Recurrent Skin Infections    HPI     Established pt of Tacos Gregory MD (new to me)    Same day/urgent care appt    He noticed a black dot to his mid back about 2 to 3 months ago, over the past few weeks, has increased in size, painful and red. Starting using warm compresses and epsom salt. Starting draining pus this morning. No fevers, n/v/d. Taking tylenol for pain which is helpful.     Past Medical History:   Diagnosis Date    Chronic kidney disease     Colon polyp     removed by colonoscopy    Glaucoma (increased eye pressure)     Goiter     MNG    Insulin resistance     Thyroid disease     Tobacco abuse     Vitamin D deficiency disease      Social History     Tobacco Use    Smoking status: Former Smoker     Packs/day: 0.50     Years: 50.00     Pack years: 25.00     Types: Cigarettes     Quit date: 2017     Years since quittin.8    Smokeless tobacco: Never Used   Substance Use Topics    Alcohol use: Yes     Alcohol/week: 5.0 standard drinks     Types: 4 Glasses of wine, 1 Cans of beer per week     Comment: maybe 3-4 essence or beer weekly    Drug use: No     Review of patient's allergies indicates:  No Known Allergies      Review of Systems   Constitutional: Negative for chills, fever and unexpected weight change.   Gastrointestinal: Negative for nausea and vomiting.   Integumentary:  Negative for rash.        As per HPI         Objective: /68 (BP Location: Right arm, Patient Position: Sitting, BP Method: Large (Manual))   Pulse 81   Resp 16   Ht 5' 11" (1.803 m)   Wt 80.6 kg (177 lb 11.1 oz)   SpO2 99%   BMI 24.78 kg/m²         Physical Exam  Vitals reviewed.   Constitutional:       General: He is not in acute distress.     Appearance: He is well-developed. He is not ill-appearing.   Pulmonary:      Effort: Pulmonary effort is normal. No respiratory distress.   Skin:     General: Skin is warm.    "   Findings: No rash.      Comments: Inflamed cyst with central black punctum, redness, and scant purulent drainage to midline of lower back   Neurological:      Mental Status: He is alert.         Assessment:       Problem List Items Addressed This Visit    None     Visit Diagnoses     Infected cyst of skin    -  Primary    Relevant Medications    doxycycline (VIBRAMYCIN) 100 MG Cap    HYDROcodone-acetaminophen (NORCO) 5-325 mg per tablet    Other Relevant Orders    Ambulatory referral/consult to General Surgery          Plan:       Hung was seen today for recurrent skin infections.    Diagnoses and all orders for this visit:    Infected epidermoid cyst of skin  Advised on warm compress, will start doxy,   Follow up with gen surgery in about 1 week for cyst removal once infection resolves  Notify clinic of any worsening symptoms  -     doxycycline (VIBRAMYCIN) 100 MG Cap; Take 1 capsule (100 mg total) by mouth every 12 (twelve) hours. for 7 days (pt with CKD)  -     Ambulatory referral/consult to General Surgery; Future  -     HYDROcodone-acetaminophen (NORCO) 5-325 mg per tablet; Take 1 tablet by mouth every 12 (twelve) hours as needed for Pain.      Kathya Holcomb PA-C

## 2022-03-04 ENCOUNTER — TELEPHONE (OUTPATIENT)
Dept: INTERNAL MEDICINE | Facility: CLINIC | Age: 72
End: 2022-03-04
Payer: MEDICARE

## 2022-03-04 DIAGNOSIS — L08.9 INFECTED CYST OF SKIN: ICD-10-CM

## 2022-03-04 DIAGNOSIS — L72.9 INFECTED CYST OF SKIN: ICD-10-CM

## 2022-03-04 RX ORDER — DOXYCYCLINE 100 MG/1
100 CAPSULE ORAL EVERY 12 HOURS
Qty: 8 CAPSULE | Refills: 0 | Status: SHIPPED | OUTPATIENT
Start: 2022-03-04 | End: 2022-03-08

## 2022-03-05 NOTE — TELEPHONE ENCOUNTER
Called to check in on his symptoms  Reports area has improved, decreased in size signifiacnlty, still draining  Has appt with general surgery next week  Will extend doxy for a few more day  Keep Gen surg follow up.

## 2022-03-08 ENCOUNTER — OFFICE VISIT (OUTPATIENT)
Dept: SURGERY | Facility: CLINIC | Age: 72
End: 2022-03-08
Payer: MEDICARE

## 2022-03-08 VITALS
TEMPERATURE: 98 F | WEIGHT: 178.56 LBS | HEIGHT: 71 IN | BODY MASS INDEX: 25 KG/M2 | SYSTOLIC BLOOD PRESSURE: 170 MMHG | HEART RATE: 58 BPM | OXYGEN SATURATION: 100 % | DIASTOLIC BLOOD PRESSURE: 89 MMHG

## 2022-03-08 DIAGNOSIS — L72.9 INFECTED CYST OF SKIN: ICD-10-CM

## 2022-03-08 DIAGNOSIS — L08.9 INFECTED CYST OF SKIN: ICD-10-CM

## 2022-03-08 PROCEDURE — 99203 OFFICE O/P NEW LOW 30 MIN: CPT | Mod: 25,S$PBB,, | Performed by: SURGERY

## 2022-03-08 PROCEDURE — 10060 I&D ABSCESS SIMPLE/SINGLE: CPT | Mod: S$PBB,GC,, | Performed by: STUDENT IN AN ORGANIZED HEALTH CARE EDUCATION/TRAINING PROGRAM

## 2022-03-08 PROCEDURE — 99999 PR PBB SHADOW E&M-EST. PATIENT-LVL IV: ICD-10-PCS | Mod: PBBFAC,,, | Performed by: SURGERY

## 2022-03-08 PROCEDURE — 10060 I&D ABSCESS SIMPLE/SINGLE: CPT | Mod: PBBFAC | Performed by: STUDENT IN AN ORGANIZED HEALTH CARE EDUCATION/TRAINING PROGRAM

## 2022-03-08 PROCEDURE — 10060 I&D ABSCESS SIMPLE/SINGLE: CPT | Mod: PBBFAC | Performed by: SURGERY

## 2022-03-08 PROCEDURE — 99999 PR PBB SHADOW E&M-EST. PATIENT-LVL IV: CPT | Mod: PBBFAC,,, | Performed by: SURGERY

## 2022-03-08 PROCEDURE — 99203 PR OFFICE/OUTPT VISIT, NEW, LEVL III, 30-44 MIN: ICD-10-PCS | Mod: 25,S$PBB,, | Performed by: SURGERY

## 2022-03-08 PROCEDURE — 87070 CULTURE OTHR SPECIMN AEROBIC: CPT | Performed by: SURGERY

## 2022-03-08 PROCEDURE — 10060 INCISION AND DRAINAGE: ICD-10-PCS | Mod: S$PBB,GC,, | Performed by: STUDENT IN AN ORGANIZED HEALTH CARE EDUCATION/TRAINING PROGRAM

## 2022-03-08 PROCEDURE — 99214 OFFICE O/P EST MOD 30 MIN: CPT | Mod: PBBFAC,25 | Performed by: SURGERY

## 2022-03-08 NOTE — PROGRESS NOTES
GENERAL SURGERY CLINIC NOTE    Hung Sorto is a 71 y.o. male with PMHx of DM II, CKD III, COPD who presents to clinic for evaluation of infected epidermoid cyst. He first noticed this in mid February. It is located on his lower back. It became increasingly painful and started draining pus. He saw his PCP who started him on Doxycycline. Currently the pain has partially improved but it is still draining. He notes another epidermoid cyst on his upper back, though this has never become infected.       Review of Systems   Constitutional: Negative for chills and fever.   Respiratory: Negative for cough and shortness of breath.    Cardiovascular: Negative for chest pain and palpitations.   Gastrointestinal: Negative for abdominal pain, nausea and vomiting.   Genitourinary: Negative for dysuria and hematuria.   Musculoskeletal: Positive for back pain (abscess). Negative for gait problem.   Skin: Negative for color change, rash and wound.   Neurological: Negative for weakness and headaches.   Hematological: Negative for adenopathy. Does not bruise/bleed easily.   Psychiatric/Behavioral: Negative for agitation and confusion.           Past Medical History:   Diagnosis Date    Chronic kidney disease     Colon polyp     removed by colonoscopy    Glaucoma (increased eye pressure)     Goiter     MNG    Insulin resistance     Thyroid disease     Tobacco abuse     Vitamin D deficiency disease        Past Surgical History:   Procedure Laterality Date    COLONOSCOPY N/A 10/28/2016    Procedure: COLONOSCOPY;  Surgeon: Melvin Berg MD;  Location: [facility] ([address] [place] [address]);  Service: Endoscopy;  Laterality: N/A;    COLONOSCOPY W/ BIOPSIES AND POLYPECTOMY         Social History     Socioeconomic History    Marital status:    Occupational History    Occupation: manager     Employer: [employer]     Comment: retired    Occupation: lawn work     Employer: OTHER     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   Tobacco Use     Smoking status: Former Smoker     Packs/day: 0.50     Years: 50.00     Pack years: 25.00     Types: Cigarettes     Quit date: 2017     Years since quittin.8    Smokeless tobacco: Never Used   Substance and Sexual Activity    Alcohol use: Yes     Alcohol/week: 5.0 standard drinks     Types: 4 Glasses of wine, 1 Cans of beer per week     Comment: maybe 3-4 essence or beer weekly    Drug use: No       Review of patient's allergies indicates:  No Known Allergies      PHYSICAL EXAM:  Vitals:    22 1040   BP: (!) 170/89   Pulse: (!) 58   Temp: 98 °F (36.7 °C)       General: NAD, appears stated age  HEENT: normocephalic, atraumatic, no scleral icterus or conjunctival injection  Neuro: AAOx3, normal gait  Cardio: RRR, pulses intact  Resp: no respiratory distress, unlabored breathing  Abd: Soft, non-distended, non-tender, no palpable mass  Ext: Warm and well perfused, no jaundice  Skin: dry, no pallor; 1.5 cm epidermoid cyst on lower back, mild amount of surrounding erythema, punctate opening with purulence draining; 1 cm epidermoid cyst on upper back, non-infected  Psych: appropriate mood and behavior      PERTINENT LABS:  Reviewed.       PERTINENT IMAGING:  Reviewed.       ASSESSMENT/PLAN:  71 y.o. male with an infected epidermoid cyst on lower back    - I&D of epidermoid cyst today, consent obtained, please see procedure note  - Provided instructions to wife on how to pack incision  - F/u in 2 weeks for wound eval and minors excision of epidermoid cysts x2  - Recommend completing Doxycycline prescription  - Please call with questions or concerns        Zaki Georges MD  Surgery Resident, PGY-3  Pager: [phone]  3/8/2022 11:17 AM

## 2022-03-08 NOTE — PROCEDURES
"Hung Sorto is a 71 y.o. male patient.    Temp: 98 °F (36.7 °C) (03/08/22 1040)  Pulse: (!) 58 (03/08/22 1040)  BP: (!) 170/89 (03/08/22 1040)  SpO2: 100 % (03/08/22 1040)  Weight: 81 kg (178 lb 9.2 oz) (03/08/22 1040)  Height: 5' 11" (180.3 cm) (03/08/22 1040)       Incision and Drainage    Date/Time: 3/8/2022 11:22 AM  Performed by: Zaki Georges MD  Authorized by: Zaki Georges MD     Consent Done?:  Yes (Written)    Type:  Cyst  Body area:  Trunk  Location details:  Back  Local anesthetic: lidocaine 1% without epinephrine  Scalpel size:  11  Incision type:  Single straight  Incision depth: dermal    Complexity:  Simple  Drainage:  Serosanguinous  Drainage amount:  Moderate  Wound treatment:  Incision, wound left open, sebum removal, expression of material, wound packed and drainage  Packing material:  1/4 in gauze  Patient tolerance:  Patient tolerated the procedure well with no immediate complications        3/8/2022  "

## 2022-03-11 LAB — BACTERIA SPEC AEROBE CULT: ABNORMAL

## 2022-03-16 ENCOUNTER — PATIENT MESSAGE (OUTPATIENT)
Dept: ADMINISTRATIVE | Facility: HOSPITAL | Age: 72
End: 2022-03-16
Payer: MEDICARE

## 2022-03-22 ENCOUNTER — PROCEDURE VISIT (OUTPATIENT)
Dept: SURGERY | Facility: CLINIC | Age: 72
End: 2022-03-22
Payer: MEDICARE

## 2022-03-22 VITALS
BODY MASS INDEX: 24.69 KG/M2 | DIASTOLIC BLOOD PRESSURE: 82 MMHG | TEMPERATURE: 98 F | HEIGHT: 71 IN | HEART RATE: 62 BPM | OXYGEN SATURATION: 98 % | SYSTOLIC BLOOD PRESSURE: 164 MMHG | WEIGHT: 176.38 LBS

## 2022-03-22 DIAGNOSIS — R22.2 SUBCUTANEOUS MASS OF BACK: Primary | ICD-10-CM

## 2022-03-22 PROCEDURE — 11403 PR EXC SKIN BENIG 2.1-3 CM TRUNK,ARM,LEG: ICD-10-PCS | Mod: S$PBB,,, | Performed by: SURGERY

## 2022-03-22 PROCEDURE — 11403 EXC TR-EXT B9+MARG 2.1-3CM: CPT | Mod: S$PBB,,, | Performed by: SURGERY

## 2022-03-22 PROCEDURE — 11403 EXC TR-EXT B9+MARG 2.1-3CM: CPT | Mod: PBBFAC | Performed by: STUDENT IN AN ORGANIZED HEALTH CARE EDUCATION/TRAINING PROGRAM

## 2022-03-22 PROCEDURE — 88307 TISSUE EXAM BY PATHOLOGIST: CPT | Performed by: STUDENT IN AN ORGANIZED HEALTH CARE EDUCATION/TRAINING PROGRAM

## 2022-03-22 PROCEDURE — 88307 PR  SURG PATH,LEVEL V: ICD-10-PCS | Mod: 26,,, | Performed by: STUDENT IN AN ORGANIZED HEALTH CARE EDUCATION/TRAINING PROGRAM

## 2022-03-22 PROCEDURE — 21555 EXC NECK LES SC < 3 CM: CPT | Mod: PBBFAC | Performed by: SURGERY

## 2022-03-22 PROCEDURE — 88307 TISSUE EXAM BY PATHOLOGIST: CPT | Mod: 26,,, | Performed by: STUDENT IN AN ORGANIZED HEALTH CARE EDUCATION/TRAINING PROGRAM

## 2022-03-22 NOTE — PROCEDURES
"Exc, Cyst    Date/Time: 3/22/2022 1:00 PM  Performed by: Geetha Fowler MD  Authorized by: Geetha Fowler MD     Consent Done?:  Yes (Written)  Time out: Immediately prior to procedure a "time out" was called to verify the correct patient, procedure, equipment, support staff and site/side marked as required.      STAFF:  I was present for the entire procedure.    INDICATIONS:cyst (Two back cysts )  LOCATION:  Body area:  Back / flankLocation modifier: Central back, 1 high and 1 low.    PREP:  Patient was prepped and draped in the normal sterile fashion.  Position:  Prone    ANESTHESIA:  Anesthesia:  Local infiltration  Local anesthetic:  Lidocaine 1% without epinephrine  Anesthetic total: 6mL    PROCEDURE DETAILS:  Excision type:  Skin  Malignancy:  Benign  Excision size (cm):  3  Scalpel size:  15  Incision type:  Single straight and single with marsupialization  Specimens?: Yes    Specimens submitted to pathology.  Hemostasis was obtained.  Estimated blood loss (cc):  5  Wound closure:  Simple  Wound repair size (cm):  3  Sutures: 3-0 Nylon   Sterile dressings:  Gauze and Tegaderm  Post-op diagnosis: Same as pre-op diagnosis.  Needle, instrument, and sponge counts were correct.  Patient tolerated the procedure well with no immediate complications.  Post-operative instructions were provided for the patient.  Patient was discharged and will follow up for wound check and pathology results.      "

## 2022-03-29 LAB
FINAL PATHOLOGIC DIAGNOSIS: NORMAL
GROSS: NORMAL
Lab: NORMAL

## 2022-04-05 ENCOUNTER — OFFICE VISIT (OUTPATIENT)
Dept: SURGERY | Facility: CLINIC | Age: 72
End: 2022-04-05
Payer: MEDICARE

## 2022-04-05 VITALS
BODY MASS INDEX: 24.69 KG/M2 | HEART RATE: 62 BPM | OXYGEN SATURATION: 98 % | WEIGHT: 176.38 LBS | TEMPERATURE: 98 F | HEIGHT: 71 IN | SYSTOLIC BLOOD PRESSURE: 181 MMHG | DIASTOLIC BLOOD PRESSURE: 79 MMHG

## 2022-04-05 DIAGNOSIS — Z48.89 POSTOPERATIVE VISIT: Primary | ICD-10-CM

## 2022-04-05 PROCEDURE — 99024 POSTOP FOLLOW-UP VISIT: CPT | Mod: POP,,, | Performed by: SURGERY

## 2022-04-05 PROCEDURE — 99024 PR POST-OP FOLLOW-UP VISIT: ICD-10-PCS | Mod: POP,,, | Performed by: SURGERY

## 2022-04-05 PROCEDURE — 99999 PR PBB SHADOW E&M-EST. PATIENT-LVL III: ICD-10-PCS | Mod: PBBFAC,,, | Performed by: SURGERY

## 2022-04-05 PROCEDURE — 99999 PR PBB SHADOW E&M-EST. PATIENT-LVL III: CPT | Mod: PBBFAC,,, | Performed by: SURGERY

## 2022-04-05 PROCEDURE — 99213 OFFICE O/P EST LOW 20 MIN: CPT | Mod: PBBFAC | Performed by: SURGERY

## 2022-04-05 NOTE — MEDICAL/APP STUDENT
General Surgery Office Visit   History and Physical    Patient Name: Hung Sorto  YOB: 1950 (71 y.o.)  MRN: [id number]  Today's Date: 2022    Referring Md:   No referring provider defined for this encounter.    SUBJECTIVE:     Visit Summary     Hung Sorto is a 71 y.o. male with PMHx of DM II, CKD III and COPD who presents to the surgery clinic for suture removal s/p I&D of an infected epidermoid cyst. Reports mild itching from the incision site in his mid lower back. Denies any symptoms with site located on his upper back. Denies bleeding, redness or drainage from both incision sites. Pt is currently well and has no complaints.         Review of patient's allergies indicates:  No Known Allergies    Past Medical History:   Diagnosis Date    Chronic kidney disease     Colon polyp     removed by colonoscopy    Glaucoma (increased eye pressure)     Goiter     MNG    Insulin resistance     Thyroid disease     Tobacco abuse     Vitamin D deficiency disease      Past Surgical History:   Procedure Laterality Date    COLONOSCOPY N/A 10/28/2016    Procedure: COLONOSCOPY;  Surgeon: Melvin Berg MD;  Location: [address]);  Service: Endoscopy;  Laterality: N/A;    COLONOSCOPY W/ BIOPSIES AND POLYPECTOMY       Family History   Problem Relation Age of Onset    Diabetes Mother     Alzheimer's disease Mother     Stroke Father     Hypertension Father     No Known Problems Daughter     No Known Problems Daughter      Social History     Tobacco Use    Smoking status: Former Smoker     Packs/day: 0.50     Years: 50.00     Pack years: 25.00     Types: Cigarettes     Quit date: 2017     Years since quittin.9    Smokeless tobacco: Never Used   Substance Use Topics    Alcohol use: Yes     Alcohol/week: 5.0 standard drinks     Types: 4 Glasses of wine, 1 Cans of beer per week     Comment: maybe 3-4 essence or beer weekly    Drug use: No        Review of Systems:  Review of  "Systems   Constitutional: Negative for chills and fever.   HENT: Negative for congestion.    Eyes: Negative for double vision and photophobia.   Cardiovascular: Negative for palpitations.   Gastrointestinal: Negative for abdominal pain and constipation.   Musculoskeletal: Negative for back pain and neck pain.   Skin:        + 2 cysts on the back   Neurological: Negative for tingling.       OBJECTIVE:     Vital Signs (Most Recent)  BP (!) 181/79 (BP Location: Left arm, Patient Position: Sitting)   Pulse 62   Temp 98 °F (36.7 °C) (Oral)   Ht 5' 11" (1.803 m)   Wt 80 kg (176 lb 5.9 oz)   SpO2 98%   BMI 24.60 kg/m²     Physical Exam  Constitutional:       Appearance: Normal appearance.   Cardiovascular:      Rate and Rhythm: Normal rate and regular rhythm.      Heart sounds: Normal heart sounds. No murmur heard.    No gallop.   Pulmonary:      Effort: Pulmonary effort is normal. No respiratory distress.      Breath sounds: Normal breath sounds. No stridor.   Skin:         Neurological:      Mental Status: He is alert and oriented to person, place, and time.             ASSESSMENT/PLAN:     Hung Sorto is a 71 y.o. male with PMHx of DMII, CKDIII and a recent epidermoid cyst infection s/p removal on .. who presents to the clinic for assessment of incision sites.    Plan  -Remove sutures from incision sites in clinic today.   -Advise pt to contact the clinic if he notices any new signs of infection at the incision sites.          Twin Sylvester MD  Acute Care Surgery  [facility] [place] Department of Surgery      "

## 2022-04-05 NOTE — PROGRESS NOTES
General Surgery Office Visit   History and Physical    Patient Name: Hung Sorto  YOB: 1950 (71 y.o.)  MRN: [id number]  Today's Date: 2022    Referring Md:   No referring provider defined for this encounter.    SUBJECTIVE:     Visit Summary     Hung Sorto is a 71 y.o. male with PMHx of DM II, CKD III and COPD who presents to the surgery clinic for suture removal s/p I&D of an infected epidermoid cyst. Reports mild itching from the incision site in his mid lower back. Denies any symptoms with site located on his upper back. Denies bleeding, redness or drainage from both incision sites. Pt is currently well and has no complaints.         Review of patient's allergies indicates:  No Known Allergies    Past Medical History:   Diagnosis Date    Chronic kidney disease     Colon polyp     removed by colonoscopy    Glaucoma (increased eye pressure)     Goiter     MNG    Insulin resistance     Thyroid disease     Tobacco abuse     Vitamin D deficiency disease      Past Surgical History:   Procedure Laterality Date    COLONOSCOPY N/A 10/28/2016    Procedure: COLONOSCOPY;  Surgeon: Melvin Berg MD;  Location: [address]);  Service: Endoscopy;  Laterality: N/A;    COLONOSCOPY W/ BIOPSIES AND POLYPECTOMY       Family History   Problem Relation Age of Onset    Diabetes Mother     Alzheimer's disease Mother     Stroke Father     Hypertension Father     No Known Problems Daughter     No Known Problems Daughter      Social History     Tobacco Use    Smoking status: Former Smoker     Packs/day: 0.50     Years: 50.00     Pack years: 25.00     Types: Cigarettes     Quit date: 2017     Years since quittin.9    Smokeless tobacco: Never Used   Substance Use Topics    Alcohol use: Yes     Alcohol/week: 5.0 standard drinks     Types: 4 Glasses of wine, 1 Cans of beer per week     Comment: maybe 3-4 essence or beer weekly    Drug use: No        Review of Systems:  Review of  "Systems   Constitutional: Negative for chills and fever.   HENT: Negative for congestion.    Eyes: Negative for double vision and photophobia.   Cardiovascular: Negative for palpitations.   Gastrointestinal: Negative for abdominal pain and constipation.   Musculoskeletal: Negative for back pain and neck pain.   Skin:        + 2 cysts on the back   Neurological: Negative for tingling.       OBJECTIVE:     Vital Signs (Most Recent)  BP (!) 181/79 (BP Location: Left arm, Patient Position: Sitting)   Pulse 62   Temp 98 °F (36.7 °C) (Oral)   Ht 5' 11" (1.803 m)   Wt 80 kg (176 lb 5.9 oz)   SpO2 98%   BMI 24.60 kg/m²     Physical Exam  Constitutional:       Appearance: Normal appearance.   Cardiovascular:      Rate and Rhythm: Normal rate and regular rhythm.      Heart sounds: Normal heart sounds. No murmur heard.    No gallop.   Pulmonary:      Effort: Pulmonary effort is normal. No respiratory distress.      Breath sounds: Normal breath sounds. No stridor.   Skin:         Neurological:      Mental Status: He is alert and oriented to person, place, and time.             ASSESSMENT/PLAN:     Hung Sorto is a 71 y.o. male with PMHx of DMII, CKDIII and a recent epidermoid cyst infection s/p removal on .. who presents to the clinic for assessment of incision sites.    Plan  -Remove sutures from incision sites in clinic today.   -Advise pt to contact the clinic if he notices any new signs of infection at the incision sites.          Twin Sylvester MD  Acute Care Surgery  [facility] [place] Department of Surgery    "

## 2022-05-22 ENCOUNTER — OFFICE VISIT (OUTPATIENT)
Dept: URGENT CARE | Facility: CLINIC | Age: 72
End: 2022-05-22
Payer: MEDICARE

## 2022-05-22 VITALS
HEART RATE: 70 BPM | SYSTOLIC BLOOD PRESSURE: 132 MMHG | WEIGHT: 176 LBS | TEMPERATURE: 98 F | HEIGHT: 71 IN | BODY MASS INDEX: 24.64 KG/M2 | RESPIRATION RATE: 17 BRPM | OXYGEN SATURATION: 96 % | DIASTOLIC BLOOD PRESSURE: 66 MMHG

## 2022-05-22 DIAGNOSIS — R50.9 FEVER, UNSPECIFIED FEVER CAUSE: ICD-10-CM

## 2022-05-22 DIAGNOSIS — U07.1 COVID-19 VIRUS DETECTED: ICD-10-CM

## 2022-05-22 DIAGNOSIS — U07.1 COVID-19 VIRUS INFECTION: Primary | ICD-10-CM

## 2022-05-22 DIAGNOSIS — U07.1 COVID: ICD-10-CM

## 2022-05-22 LAB
CTP QC/QA: YES
SARS-COV-2 RDRP RESP QL NAA+PROBE: POSITIVE

## 2022-05-22 PROCEDURE — 99213 OFFICE O/P EST LOW 20 MIN: CPT | Mod: CR,S$GLB,, | Performed by: NURSE PRACTITIONER

## 2022-05-22 PROCEDURE — 99213 PR OFFICE/OUTPT VISIT, EST, LEVL III, 20-29 MIN: ICD-10-PCS | Mod: CR,S$GLB,, | Performed by: NURSE PRACTITIONER

## 2022-05-22 PROCEDURE — U0002 COVID-19 LAB TEST NON-CDC: HCPCS | Mod: QW,CR,S$GLB, | Performed by: NURSE PRACTITIONER

## 2022-05-22 PROCEDURE — U0002: ICD-10-PCS | Mod: QW,CR,S$GLB, | Performed by: NURSE PRACTITIONER

## 2022-05-22 NOTE — PATIENT INSTRUCTIONS
Use warm salt water gargles to ease your throat pain. Warm salt water gargles as needed for sore throat-  1/2 tsp salt to 1 cup warm water, gargle as desired. Hot tea helps soothe.     Over the counter you can use Tylenol (acetominophen) or Ibuprofen for your minor aches and pains as long as you have no contraindications.    Good nutrition. Lots of rest. Plenty of fluids     Must quarantine for 5 days from start of symptoms    Referral to EUA infusion.     If symptoms persist or worsen patient go to the hospital immediately for further evaluation.     You must understand that you've received an Urgent Care treatment only and that you may be released before all your medical problems are known or treated. You, the patient, will arrange for follow up care as instructed.  Follow up with your PCP or specialty clinic as directed in the next 1-2 weeks if not improved or as needed.  You can call [phone] to schedule an appointment with the appropriate provider.  If your condition worsens we recommend that you receive another evaluation at the emergency room immediately or contact your primary medical clinics after hours call service to discuss your concerns.  Please return here or go to the Emergency Department for any concerns or worsening of condition.     Your test was POSITIVE for COVID-19 (coronavirus).       Please isolate yourself at home.  You may leave home and/or return to work once the following conditions are met:    If you were not hospitalized and are not moderately to severely immunocompromised:   More than 5 days since symptoms first appeared AND  More than 24 hours fever free without medications AND  Symptoms are improving  Continue to wear a mask around others for 5 additional days.    If you were hospitalized OR are moderately to severely immunocompromised:  More than 20 days since symptoms first appeared  More than 24 hours fever free without medications  Symptoms have improved    If you had no  symptoms but tested positive:  More than 5 days since the date of the first positive test (20 days if moderately to severely immunocompromised). If you develop symptoms, then use the guidelines above.  Continue to wear a mask around others for 5 additional days.      Contact Tracing    As one of the next steps, you will receive a call or text from the [place] Department of Health ([facility]) COVID-19 Tracing Team. See the contact information below so you know not to ignore the health departments call. It is important that you contact them back immediately so they can help.      Contact Tracer Number:  [phone]  Caller ID for most carriers: [place] Dept Health     What is contact tracing?  Contact tracing is a process that helps identify everyone who has been in close contact with an infected person. Contact tracers let those people know they may have been exposed and guide them on next steps. Confidentiality is important for everyone; no one will be told who may have exposed them to the virus.  Your involvement is important. The more we know about where and how this virus is spreading, the better chance we have at stopping it from spreading further.  What does exposure mean?  Exposure means you have been within 6 feet for more than 15 minutes with a person who has or had COVID-19.  What kind of questions do the contact tracers ask?  A contact tracer will confirm your basic contact information including name, address, phone number, and next of kin, as well as asking about any symptoms you may have had. Theyll also ask you how you think you may have gotten sick, such as places where you may have been exposed to the virus, and people you were with. Those names will never be shared with anyone outside of that call, and will only be used to help trace and stop the spread of the virus.   I have privacy concerns. How will the state use my information?  Your privacy about your health is important. All calls are completed  using call centers that use the appropriate health privacy protection measures (HIPAA compliance), meaning that your patient information is safe. No one will ever ask you any questions related to immigration status. Your health comes first.   Do I have to participate?  You do not have to participate, but we strongly encourage you to. Contact tracing can help us catch and control new outbreaks as theyre developing to keep your friends and family safe.   What if I dont hear from anyone?  If you dont receive a call within 24 hours, you can call the number above right away to inquire about your status. That line is open from 8:00 am - 8:00 p.m., 7 days a week.  Contact tracing saves lives! Together, we have the power to beat this virus and keep our loved ones and neighbors safe.    For more information see CDC link below.      https://www.cdc.gov/coronavirus/2019-ncov/hcp/guidance-prevent-spread.html#precautions        Sources:  [facility], [place] [facility] and [facility]           Sincerely,     Jolanta Richardson NP

## 2022-05-22 NOTE — PROGRESS NOTES
"Subjective:       Patient ID: Hung Sorto Jr. is a 71 y.o. male.    Vitals:  height is 5' 11" (1.803 m) and weight is 79.8 kg (176 lb). His temperature is 98.1 °F (36.7 °C). His blood pressure is 132/66 and his pulse is 70. His respiration is 17 and oxygen saturation is 96%.     Chief Complaint: Sore Throat    Patient states they have tested postive for COVID at home.     Sore Throat   This is a new problem. Episode onset: tuesday  The problem has been gradually worsening. Neither side of throat is experiencing more pain than the other. The maximum temperature recorded prior to his arrival was 100.4 - 100.9 F. The pain is at a severity of 0/10. The patient is experiencing no pain. Associated symptoms include congestion and coughing. Pertinent negatives include no abdominal pain, diarrhea, drooling, ear discharge, ear pain, headaches, hoarse voice, plugged ear sensation, neck pain, shortness of breath, stridor, swollen glands, trouble swallowing or vomiting. Treatments tried: Tylenol        HENT: Positive for congestion and sore throat. Negative for ear pain, ear discharge, drooling and trouble swallowing.    Neck: Negative for neck pain.   Respiratory: Positive for cough. Negative for shortness of breath and stridor.    Gastrointestinal: Negative for abdominal pain, vomiting and diarrhea.   Neurological: Negative for headaches.       Objective:      Physical Exam   Constitutional: He is oriented to person, place, and time. He appears well-developed. He is cooperative.  Non-toxic appearance. He does not appear ill. No distress.      Comments:Afebrile      HENT:   Head: Normocephalic and atraumatic.   Ears:   Right Ear: Hearing and external ear normal.   Left Ear: Hearing and external ear normal.   Nose: Nose normal. No mucosal edema, rhinorrhea or nasal deformity. No epistaxis. Right sinus exhibits no maxillary sinus tenderness and no frontal sinus tenderness. Left sinus exhibits no maxillary sinus tenderness " and no frontal sinus tenderness.   Mouth/Throat: Uvula is midline, oropharynx is clear and moist and mucous membranes are normal. No trismus in the jaw. Normal dentition. No uvula swelling. No posterior oropharyngeal erythema.   Eyes: Conjunctivae and lids are normal. Right eye exhibits no discharge. Left eye exhibits no discharge. No scleral icterus.   Neck: Trachea normal and phonation normal. Neck supple.   Cardiovascular: Normal rate, regular rhythm, normal heart sounds and normal pulses.   Pulmonary/Chest: Effort normal and breath sounds normal. No respiratory distress.   Lungs clear bilaterally          Comments: Lungs clear bilaterally     Abdominal: Normal appearance.   Musculoskeletal: Normal range of motion.         General: No deformity. Normal range of motion.   Neurological: He is alert and oriented to person, place, and time. He exhibits normal muscle tone. Coordination normal.   Skin: Skin is warm, dry, intact, not diaphoretic and not pale.   Psychiatric: His speech is normal and behavior is normal. Judgment and thought content normal.   Nursing note and vitals reviewed.        Results for orders placed or performed in visit on 05/22/22   POCT COVID-19 Rapid Screening   Result Value Ref Range    POC Rapid COVID Positive (A) Negative     Acceptable Yes      Assessment:       1. COVID-19 virus infection    2. Fever, unspecified fever cause    3. COVID          Plan:       5  COVID-19 virus infection  -     Ambulatory referral/consult to [facility]    Fever, unspecified fever cause  -     POCT COVID-19 Rapid Screening    COVID      Patient Instructions   Use warm salt water gargles to ease your throat pain. Warm salt water gargles as needed for sore throat-  1/2 tsp salt to 1 cup warm water, gargle as desired. Hot tea helps soothe.     Over the counter you can use Tylenol (acetominophen) or Ibuprofen for your minor aches and pains as long as you have no contraindications.    Good  nutrition. Lots of rest. Plenty of fluids     Must quarantine for 5 days from start of symptoms    Referral to [facility] infusion.     If symptoms persist or worsen patient go to the hospital immediately for further evaluation.     You must understand that you've received an Urgent Care treatment only and that you may be released before all your medical problems are known or treated. You, the patient, will arrange for follow up care as instructed.  Follow up with your PCP or specialty clinic as directed in the next 1-2 weeks if not improved or as needed.  You can call [phone] to schedule an appointment with the appropriate provider.  If your condition worsens we recommend that you receive another evaluation at the emergency room immediately or contact your primary medical clinics after hours call service to discuss your concerns.  Please return here or go to the Emergency Department for any concerns or worsening of condition.     Your test was POSITIVE for COVID-19 (coronavirus).       Please isolate yourself at home.  You may leave home and/or return to work once the following conditions are met:    If you were not hospitalized and are not moderately to severely immunocompromised:    More than 5 days since symptoms first appeared AND   More than 24 hours fever free without medications AND   Symptoms are improving   Continue to wear a mask around others for 5 additional days.    If you were hospitalized OR are moderately to severely immunocompromised:   More than 20 days since symptoms first appeared   More than 24 hours fever free without medications   Symptoms have improved    If you had no symptoms but tested positive:   More than 5 days since the date of the first positive test (20 days if moderately to severely immunocompromised). If you develop symptoms, then use the guidelines above.   Continue to wear a mask around others for 5 additional days.      Contact Tracing    As one of the next steps, you  will receive a call or text from the [place] Department of Health ([facility]) COVID-19 Tracing Team. See the contact information below so you know not to ignore the health departments call. It is important that you contact them back immediately so they can help.      Contact Tracer Number:  [phone]  Caller ID for most carriers: [place] Dept Health     What is contact tracing?  · Contact tracing is a process that helps identify everyone who has been in close contact with an infected person. Contact tracers let those people know they may have been exposed and guide them on next steps. Confidentiality is important for everyone; no one will be told who may have exposed them to the virus.  · Your involvement is important. The more we know about where and how this virus is spreading, the better chance we have at stopping it from spreading further.  What does exposure mean?  · Exposure means you have been within 6 feet for more than 15 minutes with a person who has or had COVID-19.  What kind of questions do the contact tracers ask?  · A contact tracer will confirm your basic contact information including name, address, phone number, and next of kin, as well as asking about any symptoms you may have had. Theyll also ask you how you think you may have gotten sick, such as places where you may have been exposed to the virus, and people you were with. Those names will never be shared with anyone outside of that call, and will only be used to help trace and stop the spread of the virus.   I have privacy concerns. How will the state use my information?  · Your privacy about your health is important. All calls are completed using call centers that use the appropriate health privacy protection measures (HIPAA compliance), meaning that your patient information is safe. No one will ever ask you any questions related to immigration status. Your health comes first.   Do I have to participate?  · You do not have to participate, but we  strongly encourage you to. Contact tracing can help us catch and control new outbreaks as theyre developing to keep your friends and family safe.   What if I dont hear from anyone?  · If you dont receive a call within 24 hours, you can call the number above right away to inquire about your status. That line is open from 8:00 am - 8:00 p.m., 7 days a week.  Contact tracing saves lives! Together, we have the power to beat this virus and keep our loved ones and neighbors safe.    For more information see CDC link below.      https://www.cdc.gov/coronavirus/2019-ncov/hcp/guidance-prevent-spread.html#precautions        Sources:  [facility], [place] [facility] and [facility]           Sincerely,     Jolanta Richardson NP

## 2022-05-24 ENCOUNTER — INFUSION (OUTPATIENT)
Dept: INFECTIOUS DISEASES | Facility: HOSPITAL | Age: 72
End: 2022-05-24
Attending: INTERNAL MEDICINE
Payer: MEDICARE

## 2022-05-24 VITALS
RESPIRATION RATE: 18 BRPM | HEIGHT: 71 IN | OXYGEN SATURATION: 96 % | DIASTOLIC BLOOD PRESSURE: 88 MMHG | WEIGHT: 175 LBS | TEMPERATURE: 98 F | BODY MASS INDEX: 24.5 KG/M2 | HEART RATE: 58 BPM | SYSTOLIC BLOOD PRESSURE: 119 MMHG

## 2022-05-24 DIAGNOSIS — U07.1 COVID-19: Primary | ICD-10-CM

## 2022-05-24 PROCEDURE — M0222 HC IV INJECTION, BEBTELOVIMAB, INCL POST ADMIN MONIT: HCPCS | Performed by: INTERNAL MEDICINE

## 2022-05-24 PROCEDURE — 63600175 PHARM REV CODE 636 W HCPCS: Performed by: INTERNAL MEDICINE

## 2022-05-24 RX ORDER — EPINEPHRINE 0.3 MG/.3ML
0.3 INJECTION SUBCUTANEOUS
Status: ACTIVE | OUTPATIENT
Start: 2022-05-24 | End: 2022-05-27

## 2022-05-24 RX ORDER — ONDANSETRON 4 MG/1
4 TABLET, ORALLY DISINTEGRATING ORAL
Status: ACTIVE | OUTPATIENT
Start: 2022-05-24 | End: 2022-05-25

## 2022-05-24 RX ORDER — BEBTELOVIMAB 87.5 MG/ML
175 INJECTION, SOLUTION INTRAVENOUS
Status: COMPLETED | OUTPATIENT
Start: 2022-05-24 | End: 2022-05-24

## 2022-05-24 RX ORDER — ACETAMINOPHEN 325 MG/1
650 TABLET ORAL
Status: ACTIVE | OUTPATIENT
Start: 2022-05-24 | End: 2022-05-25

## 2022-05-24 RX ORDER — DIPHENHYDRAMINE HYDROCHLORIDE 50 MG/ML
25 INJECTION INTRAMUSCULAR; INTRAVENOUS
Status: ACTIVE | OUTPATIENT
Start: 2022-05-24 | End: 2022-05-25

## 2022-05-24 RX ORDER — ALBUTEROL SULFATE 90 UG/1
2 AEROSOL, METERED RESPIRATORY (INHALATION)
Status: ACTIVE | OUTPATIENT
Start: 2022-05-24 | End: 2022-05-27

## 2022-05-24 RX ADMIN — BEBTELOVIMAB 175 MG: 87.5 INJECTION, SOLUTION INTRAVENOUS at 02:05

## 2022-05-24 NOTE — PROGRESS NOTES
Patient arrives for Bebtelovimab IV Injection. Ambulatory. Pt AAox3. No distress noted. RR even and unlabored.     Symptoms and onset date:  Tiredness, sore throat, cough     Tested COVID + on 05.22.22

## 2022-05-24 NOTE — PROGRESS NOTES
Patient remains with no signs of complications noted. Patient received casirivimab/ imdevimab infusion with filtered tubing according to FDA recommendations and [facility] SOP without complications noted and left with mask in place. Drug fact sheet provided. Pt discharged home. Ambulatory. Remains AAox3. No distress noted. RR even and unlabored.

## 2022-06-27 ENCOUNTER — LAB VISIT (OUTPATIENT)
Dept: LAB | Facility: HOSPITAL | Age: 72
End: 2022-06-27
Attending: INTERNAL MEDICINE
Payer: MEDICARE

## 2022-06-27 DIAGNOSIS — E78.5 HYPERLIPIDEMIA, UNSPECIFIED HYPERLIPIDEMIA TYPE: ICD-10-CM

## 2022-06-27 DIAGNOSIS — R79.9 ABNORMAL FINDING OF BLOOD CHEMISTRY, UNSPECIFIED: ICD-10-CM

## 2022-06-27 DIAGNOSIS — N18.31 STAGE 3A CHRONIC KIDNEY DISEASE: ICD-10-CM

## 2022-06-27 LAB
ALBUMIN SERPL BCP-MCNC: 4 G/DL (ref 3.5–5.2)
ALP SERPL-CCNC: 54 U/L (ref 55–135)
ALT SERPL W/O P-5'-P-CCNC: 30 U/L (ref 10–44)
ANION GAP SERPL CALC-SCNC: 8 MMOL/L (ref 8–16)
AST SERPL-CCNC: 26 U/L (ref 10–40)
BILIRUB SERPL-MCNC: 1.3 MG/DL (ref 0.1–1)
BUN SERPL-MCNC: 37 MG/DL (ref 8–23)
CALCIUM SERPL-MCNC: 10 MG/DL (ref 8.7–10.5)
CHLORIDE SERPL-SCNC: 106 MMOL/L (ref 95–110)
CHOLEST SERPL-MCNC: 107 MG/DL (ref 120–199)
CHOLEST/HDLC SERPL: 2.7 {RATIO} (ref 2–5)
CO2 SERPL-SCNC: 25 MMOL/L (ref 23–29)
CREAT SERPL-MCNC: 1.9 MG/DL (ref 0.5–1.4)
EST. GFR  (AFRICAN AMERICAN): 39.6 ML/MIN/1.73 M^2
EST. GFR  (NON AFRICAN AMERICAN): 34.2 ML/MIN/1.73 M^2
ESTIMATED AVG GLUCOSE: 146 MG/DL (ref 68–131)
GLUCOSE SERPL-MCNC: 149 MG/DL (ref 70–110)
HBA1C MFR BLD: 6.7 % (ref 4–5.6)
HDLC SERPL-MCNC: 40 MG/DL (ref 40–75)
HDLC SERPL: 37.4 % (ref 20–50)
LDLC SERPL CALC-MCNC: 54.2 MG/DL (ref 63–159)
NONHDLC SERPL-MCNC: 67 MG/DL
POTASSIUM SERPL-SCNC: 4.7 MMOL/L (ref 3.5–5.1)
PROT SERPL-MCNC: 7.4 G/DL (ref 6–8.4)
SODIUM SERPL-SCNC: 139 MMOL/L (ref 136–145)
TRIGL SERPL-MCNC: 64 MG/DL (ref 30–150)

## 2022-06-27 PROCEDURE — 36415 COLL VENOUS BLD VENIPUNCTURE: CPT | Mod: PN | Performed by: INTERNAL MEDICINE

## 2022-06-27 PROCEDURE — 80053 COMPREHEN METABOLIC PANEL: CPT | Performed by: INTERNAL MEDICINE

## 2022-06-27 PROCEDURE — 80061 LIPID PANEL: CPT | Performed by: INTERNAL MEDICINE

## 2022-06-27 PROCEDURE — 83036 HEMOGLOBIN GLYCOSYLATED A1C: CPT | Performed by: INTERNAL MEDICINE

## 2022-07-01 ENCOUNTER — OFFICE VISIT (OUTPATIENT)
Dept: INTERNAL MEDICINE | Facility: CLINIC | Age: 72
End: 2022-07-01
Payer: MEDICARE

## 2022-07-01 VITALS
HEIGHT: 71 IN | SYSTOLIC BLOOD PRESSURE: 126 MMHG | HEART RATE: 64 BPM | WEIGHT: 177.25 LBS | OXYGEN SATURATION: 97 % | DIASTOLIC BLOOD PRESSURE: 86 MMHG | BODY MASS INDEX: 24.81 KG/M2

## 2022-07-01 DIAGNOSIS — N18.31 TYPE 2 DIABETES MELLITUS WITH STAGE 3A CHRONIC KIDNEY DISEASE, WITHOUT LONG-TERM CURRENT USE OF INSULIN: Primary | ICD-10-CM

## 2022-07-01 DIAGNOSIS — E21.3 HYPERPARATHYROIDISM: ICD-10-CM

## 2022-07-01 DIAGNOSIS — E11.22 TYPE 2 DIABETES MELLITUS WITH STAGE 3A CHRONIC KIDNEY DISEASE, WITHOUT LONG-TERM CURRENT USE OF INSULIN: Primary | ICD-10-CM

## 2022-07-01 DIAGNOSIS — K63.5 POLYP OF COLON, UNSPECIFIED PART OF COLON, UNSPECIFIED TYPE: ICD-10-CM

## 2022-07-01 DIAGNOSIS — E04.2 MULTINODULAR NON-TOXIC GOITER: ICD-10-CM

## 2022-07-01 DIAGNOSIS — J44.9 STAGE 2 MODERATE COPD BY GOLD CLASSIFICATION: ICD-10-CM

## 2022-07-01 DIAGNOSIS — N17.9 AKI (ACUTE KIDNEY INJURY): ICD-10-CM

## 2022-07-01 DIAGNOSIS — N18.32 STAGE 3B CHRONIC KIDNEY DISEASE: ICD-10-CM

## 2022-07-01 DIAGNOSIS — Z87.891 H/O TOBACCO USE, PRESENTING HAZARDS TO HEALTH: ICD-10-CM

## 2022-07-01 PROBLEM — U07.1 COVID: Status: RESOLVED | Noted: 2022-05-22 | Resolved: 2022-07-01

## 2022-07-01 LAB
BILIRUB UR QL STRIP: NEGATIVE
CLARITY UR REFRACT.AUTO: CLEAR
COLOR UR AUTO: ABNORMAL
GLUCOSE UR QL STRIP: ABNORMAL
HGB UR QL STRIP: NEGATIVE
KETONES UR QL STRIP: NEGATIVE
LEUKOCYTE ESTERASE UR QL STRIP: NEGATIVE
NITRITE UR QL STRIP: NEGATIVE
PH UR STRIP: 5 [PH] (ref 5–8)
PROT UR QL STRIP: NEGATIVE
SP GR UR STRIP: 1.01 (ref 1–1.03)
URN SPEC COLLECT METH UR: ABNORMAL

## 2022-07-01 PROCEDURE — 99999 PR PBB SHADOW E&M-EST. PATIENT-LVL III: ICD-10-PCS | Mod: PBBFAC,,, | Performed by: INTERNAL MEDICINE

## 2022-07-01 PROCEDURE — 99214 OFFICE O/P EST MOD 30 MIN: CPT | Mod: S$PBB,,, | Performed by: INTERNAL MEDICINE

## 2022-07-01 PROCEDURE — 99213 OFFICE O/P EST LOW 20 MIN: CPT | Mod: PBBFAC | Performed by: INTERNAL MEDICINE

## 2022-07-01 PROCEDURE — 99999 PR PBB SHADOW E&M-EST. PATIENT-LVL III: CPT | Mod: PBBFAC,,, | Performed by: INTERNAL MEDICINE

## 2022-07-01 PROCEDURE — 81003 URINALYSIS AUTO W/O SCOPE: CPT | Performed by: INTERNAL MEDICINE

## 2022-07-01 PROCEDURE — 99214 PR OFFICE/OUTPT VISIT, EST, LEVL IV, 30-39 MIN: ICD-10-PCS | Mod: S$PBB,,, | Performed by: INTERNAL MEDICINE

## 2022-07-01 NOTE — PROGRESS NOTES
Mr. Hung Sorto is a 72  y.o. male here for an annual physical     1. DM: Patient now with HgBA1C 6.7    Amaryl 4 mg -- and   metformin to 1000  mg a day,  .  He was on Januvia, but it was too expensive .  .Glucose was averaging 111 since last visit.   Reports eating oat meal, salad and sometimes indulging with fries. He denies polyuria, polydipsia and increased thirst. He denies any recent changes in vision, he also denies any numbness or tingling.He has lost 7  since last time.  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       2. CKD: Patient has CKDIII, He renal function is worse at  stable Cr 1.9  with GFR 39 ml/min.  He did have covid in May 2022.       3. COPD: Patient has COPD with a 50 pk year smoking history. He quit in 2017. He has an albuterol inhaler which he rarely uses. He denies any wheezing, SOB or dyspnea on exertion. 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  He did his Lung screening CT  In 5/2021.        4. HLD: He is currently on Atorvastatin 20mg. Current LDL 54 , HDL 40     5. Glaucoma: Patient follows with Opthalmology frequently 12/2021. He denies any vision loss or blurry vision.  He is going to get laser surgery for his glaucoma.             He otherwise has no other complaints today.     Review of Systems:      Constitutional: no fever or chills  Eyes: no visual changes  ENT: no nasal congestion or sore throat  Respiratory: no cough or shortness of breath  Cardiovascular: no chest pain or palpitations  Gastrointestinal: no nausea or vomiting, no abdominal pain or change in bowel habits  Genitourinary: no hematuria or dysuria  Musculoskeletal: no arthralgias or myalgias  Neurological: no seizures or tremors  Skin: No rashes      Review of patient's allergies indicates:  No Known Allergies     OBJECTIVE:   Vital Signs:            /86 (BP Location: Left arm, Patient Position: Sitting, BP Method: Large (Manual))   Pulse 64    "Ht 5' 11" (1.803 m)   Wt 80.4 kg (177 lb 4 oz)   SpO2 97%   BMI 24.72 kg/m²           Physical Exam:   General:  Well developed, well nourished, no acute distress  Eyes: look a little red-- he has glaucoma and he says the eye drops cause this.    HEENT:  Normocephalic, atraumatic, PERRL, EOMI, clear sclera, throat clear without erythema or exudates  Neck: No carotid bruits. No thyromegaly.   CVS:  RRR, S1 and S2 normal, no murmurs, rubs, gallops  Resp:  Lungs clear to auscultation, no wheezes, rales, rhonchi, cough  GI:  Abdomen soft, non-tender, non-distended, normoactive bowel sounds, no masses  MSK:  No muscle atrophy, cyanosis, peripheral edema, full range of motion  Skin:  No rashes, ulcers, erythema-- sk on left breast--   Neuro:  CNII-XII grossly intact. No gross motor/sensory deficits  Psych:  Alert and oriented to person, place, and time    Protective Sensation (w/ 10 gram monofilament):  Right: Intact  Left: Intact    Visual Inspection:  Callus -  Bilateral    Pedal Pulses:   Right: Present  Left: Present    Posterior tibialis:   Right:Present  Left: Present                          ASSESSMENT & PLAN:            Diagnoses and all orders for this visit:     Polyp of colon, unspecified part of colon, unspecified type  Colonoscopy  Due in 10/2026     Screening for cancer  -     Diabetes mellitus without complication  -    -     Recommend continue diet modification  -       -     Return to clinic in 6  months for follow up.     Stage 2 moderate COPD by GOLD classification        -     Continue Albuterol inhaler prn  CT Chest Lung Screening Low Dose - will order      Stage 3 chronic kidney disease-- worse-- hydrate and recheck in 1 month-- check a UA.    -    reviewed-- has benign renal cyst.      Elevated BP-- better  -- not on htn meds        Multinodular goiter--we reviewed the  repeat thyroid us from march 2021--  he has not seen endo since 2014.       Will wait on covid booster since he had covid last " month

## 2022-07-06 ENCOUNTER — HOSPITAL ENCOUNTER (OUTPATIENT)
Dept: RADIOLOGY | Facility: HOSPITAL | Age: 72
Discharge: HOME | End: 2022-07-06
Attending: INTERNAL MEDICINE
Payer: MEDICARE

## 2022-07-06 DIAGNOSIS — Z87.891 H/O TOBACCO USE, PRESENTING HAZARDS TO HEALTH: ICD-10-CM

## 2022-07-06 PROCEDURE — 71271 CT THORAX LUNG CANCER SCR C-: CPT | Mod: TC

## 2022-07-06 PROCEDURE — 71271 CT CHEST LUNG SCREENING LOW DOSE: ICD-10-PCS | Mod: 26,,, | Performed by: RADIOLOGY

## 2022-07-06 PROCEDURE — 71271 CT THORAX LUNG CANCER SCR C-: CPT | Mod: 26,,, | Performed by: RADIOLOGY

## 2022-07-25 ENCOUNTER — LAB VISIT (OUTPATIENT)
Dept: LAB | Facility: HOSPITAL | Age: 72
End: 2022-07-25
Attending: INTERNAL MEDICINE
Payer: MEDICARE

## 2022-07-25 DIAGNOSIS — N17.9 AKI (ACUTE KIDNEY INJURY): ICD-10-CM

## 2022-07-25 LAB
ANION GAP SERPL CALC-SCNC: 5 MMOL/L (ref 8–16)
BUN SERPL-MCNC: 34 MG/DL (ref 8–23)
CALCIUM SERPL-MCNC: 9.7 MG/DL (ref 8.7–10.5)
CHLORIDE SERPL-SCNC: 106 MMOL/L (ref 95–110)
CO2 SERPL-SCNC: 26 MMOL/L (ref 23–29)
CREAT SERPL-MCNC: 1.9 MG/DL (ref 0.5–1.4)
EST. GFR  (AFRICAN AMERICAN): 39.6 ML/MIN/1.73 M^2
EST. GFR  (NON AFRICAN AMERICAN): 34.2 ML/MIN/1.73 M^2
GLUCOSE SERPL-MCNC: 139 MG/DL (ref 70–110)
POTASSIUM SERPL-SCNC: 4.3 MMOL/L (ref 3.5–5.1)
SODIUM SERPL-SCNC: 137 MMOL/L (ref 136–145)

## 2022-07-25 PROCEDURE — 80048 BASIC METABOLIC PNL TOTAL CA: CPT | Performed by: INTERNAL MEDICINE

## 2022-07-25 PROCEDURE — 36415 COLL VENOUS BLD VENIPUNCTURE: CPT | Mod: PN | Performed by: INTERNAL MEDICINE

## 2022-08-01 ENCOUNTER — OFFICE VISIT (OUTPATIENT)
Dept: INTERNAL MEDICINE | Facility: CLINIC | Age: 72
End: 2022-08-01
Payer: MEDICARE

## 2022-08-01 VITALS
WEIGHT: 180.13 LBS | BODY MASS INDEX: 25.22 KG/M2 | HEART RATE: 66 BPM | DIASTOLIC BLOOD PRESSURE: 82 MMHG | OXYGEN SATURATION: 99 % | SYSTOLIC BLOOD PRESSURE: 130 MMHG | HEIGHT: 71 IN

## 2022-08-01 DIAGNOSIS — E11.22 TYPE 2 DIABETES MELLITUS WITH STAGE 3B CHRONIC KIDNEY DISEASE, WITHOUT LONG-TERM CURRENT USE OF INSULIN: ICD-10-CM

## 2022-08-01 DIAGNOSIS — N18.32 STAGE 3B CHRONIC KIDNEY DISEASE: Primary | ICD-10-CM

## 2022-08-01 DIAGNOSIS — N18.32 TYPE 2 DIABETES MELLITUS WITH STAGE 3B CHRONIC KIDNEY DISEASE, WITHOUT LONG-TERM CURRENT USE OF INSULIN: ICD-10-CM

## 2022-08-01 PROCEDURE — 99999 PR PBB SHADOW E&M-EST. PATIENT-LVL IV: CPT | Mod: PBBFAC,,, | Performed by: INTERNAL MEDICINE

## 2022-08-01 PROCEDURE — 99214 OFFICE O/P EST MOD 30 MIN: CPT | Mod: S$PBB,,, | Performed by: INTERNAL MEDICINE

## 2022-08-01 PROCEDURE — 99214 PR OFFICE/OUTPT VISIT, EST, LEVL IV, 30-39 MIN: ICD-10-PCS | Mod: S$PBB,,, | Performed by: INTERNAL MEDICINE

## 2022-08-01 PROCEDURE — 99999 PR PBB SHADOW E&M-EST. PATIENT-LVL IV: ICD-10-PCS | Mod: PBBFAC,,, | Performed by: INTERNAL MEDICINE

## 2022-08-01 PROCEDURE — 99214 OFFICE O/P EST MOD 30 MIN: CPT | Mod: PBBFAC | Performed by: INTERNAL MEDICINE

## 2022-08-01 NOTE — PROGRESS NOTES
Clinic Note  8/1/2022      Subjective:       Patient ID:  Hung is a 72 y.o. male being seen for follow up of elevated Cr (1.9)    Chief Complaint: Follow-up    Mr Hung Sorto is a 72 year old male patient with Hx of CKD 3b, DM2, COPD, Glaucoma and HLD who presents for elevated Cr    CKD 3B: Patient had COVID-19 (05/22). Did not require hospital admission or intubation. Cr (12/21) - 1.4 --> Cr (06/22) - 1.9 --> Cr (07/25) - 1.9. Renal ultrasound from 2019 revealed only simple cysts. Patient is not currently on any nephrotoxic medications. Denies NSAID use. No OTC NSAID containing medications. GFR (07/22) - 39.6. On metformin for DM. 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 States he drinks about 10 bottles of water daily. Denies any abdominal pain, dysuria or fevers. Will continue to monitor. Concerning that this could be patient's new baseline.      Review of Systems   Constitutional: Negative for chills and fever.   Eyes: Negative for blurred vision.   Respiratory: Negative for cough.    Cardiovascular: Negative for chest pain.   Gastrointestinal: Negative for abdominal pain.   Genitourinary: Negative for dysuria.       Past Medical History:   Diagnosis Date    Chronic kidney disease     Colon polyp     removed by colonoscopy    Glaucoma (increased eye pressure)     Goiter     MNG    Insulin resistance     Thyroid disease     Tobacco abuse     Vitamin D deficiency disease        Family History   Problem Relation Age of Onset    Diabetes Mother     Alzheimer's disease Mother     Stroke Father     Hypertension Father     No Known Problems Daughter     No Known Problems Daughter         reports that he quit smoking about 5 years ago. His smoking use included cigarettes. He has a 25.00 pack-year smoking history. He has never used smokeless tobacco. He reports current alcohol use of about 5.0 standard drinks of alcohol per  "week. He reports that he does not use drugs.    Medication List with Changes/Refills   Current Medications    ASPIRIN (ECOTRIN) 81 MG EC TABLET    Take 81 mg by mouth once daily.    ATORVASTATIN (LIPITOR) 20 MG TABLET    Take 1 tablet (20 mg total) by mouth once daily.    CHOLECALCIFEROL, VITAMIN D3, 125 MCG (5,000 UNIT) TAB    Take 5,000 Units by mouth once daily.    DORZOLAMIDE (TRUSOPT) 2 % OPHTHALMIC SOLUTION    Place 1 drop into both eyes 3 (three) times daily.     GLIMEPIRIDE (AMARYL) 4 MG TABLET    Take 1 tablet (4 mg total) by mouth before breakfast.    METFORMIN (GLUCOPHAGE) 500 MG TABLET    Take 2 tablets (1,000 mg total) by mouth daily with breakfast.   Discontinued Medications    ALBUTEROL (PROVENTIL/VENTOLIN HFA) 90 MCG/ACTUATION INHALER    Inhale 2 puffs into the lungs every 6 (six) hours as needed for Wheezing.    BLOOD SUGAR DIAGNOSTIC STRP    1 strip by Misc.(Non-Drug; Combo Route) route 2 (two) times daily. Truemetrix strips please    HYDROCODONE-ACETAMINOPHEN (NORCO) 5-325 MG PER TABLET    Take 1 tablet by mouth every 12 (twelve) hours as needed for Pain.    LANCETS MISC    1 Device by Misc.(Non-Drug; Combo Route) route 2 (two) times daily.     Review of patient's allergies indicates:  No Known Allergies    Patient Active Problem List   Diagnosis    Stage 3 chronic kidney disease    Multinodular non-toxic goiter    Colon polyp    Stage 2 moderate COPD by GOLD classification    H/O tobacco use, presenting hazards to health    Type 2 diabetes mellitus with stage 3a chronic kidney disease, without long-term current use of insulin    Hyperparathyroidism           Objective:      /82   Pulse 66   Ht 5' 11" (1.803 m)   Wt 81.7 kg (180 lb 1.9 oz)   SpO2 99%   BMI 25.12 kg/m²   Estimated body mass index is 25.12 kg/m² as calculated from the following:    Height as of this encounter: 5' 11" (1.803 m).    Weight as of this encounter: 81.7 kg (180 lb 1.9 oz).  Physical Exam  Constitutional: "       Appearance: Normal appearance.   HENT:      Head: Normocephalic and atraumatic.      Nose: Nose normal.   Eyes:      Pupils: Pupils are equal, round, and reactive to light.   Cardiovascular:      Rate and Rhythm: Normal rate and regular rhythm.      Heart sounds: No murmur heard.  Pulmonary:      Effort: Pulmonary effort is normal.      Breath sounds: No wheezing.   Abdominal:      General: Abdomen is flat.      Palpations: Abdomen is soft.      Tenderness: There is no abdominal tenderness.   Musculoskeletal:         General: Normal range of motion.      Cervical back: Normal range of motion.   Skin:     General: Skin is warm.   Neurological:      Mental Status: He is alert.           Assessment and Plan:         Hung was seen today for follow-up.    Diagnoses and all orders for this visit:    Stage 3b chronic kidney disease  -     US Retroperitoneal Complete (Kidney and; Future  -     Comprehensive Metabolic Panel; Future  -     Hemoglobin A1C; Future  -     CBC Auto Differential; Future    Type 2 diabetes mellitus with stage 3b chronic kidney disease, without long-term current use of insulin  -     Hemoglobin A1C; Future        No follow-ups on file.    Other Orders Placed This Visit:  Orders Placed This Encounter   Procedures    US Retroperitoneal Complete (Kidney and    Comprehensive Metabolic Panel    Hemoglobin A1C    CBC Auto Differential         Antonio Herrera MD  Internal Medicine, PGY-1    Discussed with Dr. Gregory

## 2022-08-04 NOTE — PROGRESS NOTES
"I have personally taken the history and examined this patient and agree with the resident's note as stated above with the following thoughts:  /82   Pulse 66   Ht 5' 11" (1.803 m)   Wt 81.7 kg (180 lb 1.9 oz)   SpO2 99%   BMI 25.12 kg/m²     Discussed getting vaccines up to date.  Discussed importance of low fat diet and exercise .  His baseline creatinine was around 1.6 but it dropped down to 1.4 before coming back up to 1.9.  This might just be some worsening of his chronic kidney disease but we are going to get ultrasound to rule out any changes postobstructive problems.  Drink plenty of fluids avoid nephrotoxins and will follow him up again in a couple months.  "

## 2022-08-12 ENCOUNTER — HOSPITAL ENCOUNTER (OUTPATIENT)
Dept: RADIOLOGY | Facility: HOSPITAL | Age: 72
Discharge: HOME | End: 2022-08-12
Attending: INTERNAL MEDICINE
Payer: MEDICARE

## 2022-08-12 DIAGNOSIS — N18.32 STAGE 3B CHRONIC KIDNEY DISEASE: ICD-10-CM

## 2022-08-12 PROCEDURE — 76770 US EXAM ABDO BACK WALL COMP: CPT | Mod: 26,,, | Performed by: RADIOLOGY

## 2022-08-12 PROCEDURE — 76770 US EXAM ABDO BACK WALL COMP: CPT | Mod: TC

## 2022-08-12 PROCEDURE — 76770 US RETROPERITONEAL COMPLETE: ICD-10-PCS | Mod: 26,,, | Performed by: RADIOLOGY

## 2022-08-15 ENCOUNTER — TELEPHONE (OUTPATIENT)
Dept: INTERNAL MEDICINE | Facility: CLINIC | Age: 72
End: 2022-08-15

## 2022-09-12 LAB
LEFT EYE DM RETINOPATHY: NEGATIVE
RIGHT EYE DM RETINOPATHY: NEGATIVE

## 2022-11-15 ENCOUNTER — PATIENT OUTREACH (OUTPATIENT)
Dept: ADMINISTRATIVE | Facility: HOSPITAL | Age: 72
End: 2022-11-15
Payer: MEDICARE

## 2022-11-15 DIAGNOSIS — N18.31 TYPE 2 DIABETES MELLITUS WITH STAGE 3A CHRONIC KIDNEY DISEASE, WITHOUT LONG-TERM CURRENT USE OF INSULIN: Primary | ICD-10-CM

## 2022-11-15 DIAGNOSIS — E11.22 TYPE 2 DIABETES MELLITUS WITH STAGE 3A CHRONIC KIDNEY DISEASE, WITHOUT LONG-TERM CURRENT USE OF INSULIN: Primary | ICD-10-CM

## 2022-11-15 NOTE — PROGRESS NOTES
Health Maintenance Due   Topic Date Due    Shingles Vaccine (2 of 3) 06/20/2016    Eye Exam  12/08/2022    Diabetes Urine Screening  12/13/2022     Triggered LINKS and reconciled immunizations. Updated Care Everywhere. Checked for outside lab results in [facility]; no results found. Order placed for urine microalbumin; linked to upcoming scheduled lab appt on 11/18/2022. Record request sent to Dr. Silvano Hernandez asking for a copy of pt's most recent eye exam results. Chart review completed.

## 2022-11-15 NOTE — LETTER
AUTHORIZATION FOR RELEASE OF   CONFIDENTIAL INFORMATION    Dear Dr. Silvano Hernandez,    We are seeing Hung Sorto Jr., date of birth 1950, in the clinic at [facility]. Tacos Gregory MD is the patient's PCP. Hung Sorto Jr. has an outstanding lab/procedure at the time we reviewed his chart. In order to help keep his health information updated, he has authorized us to request the following medical record(s):        (  )  MAMMOGRAM                                      (  )  COLONOSCOPY      (  )  PAP SMEAR                                          (  )  OUTSIDE LAB RESULTS     (  )  DEXA SCAN                                          ( X )  EYE EXAM            (  )  FOOT EXAM                                          (  )  ENTIRE RECORD     (  )  OUTSIDE IMMUNIZATIONS                 (  )  _______________         Please fax records to Tacos Gregory MD, [phone]     If you have any questions, please contact [facility] at [phone].           Patient Name: Hung Sorto Jr.  : 1950  Patient Phone #: [phone]

## 2022-11-18 ENCOUNTER — LAB VISIT (OUTPATIENT)
Dept: LAB | Facility: HOSPITAL | Age: 72
End: 2022-11-18
Attending: INTERNAL MEDICINE
Payer: MEDICARE

## 2022-11-18 DIAGNOSIS — E11.22 TYPE 2 DIABETES MELLITUS WITH STAGE 3B CHRONIC KIDNEY DISEASE, WITHOUT LONG-TERM CURRENT USE OF INSULIN: ICD-10-CM

## 2022-11-18 DIAGNOSIS — N18.32 TYPE 2 DIABETES MELLITUS WITH STAGE 3B CHRONIC KIDNEY DISEASE, WITHOUT LONG-TERM CURRENT USE OF INSULIN: ICD-10-CM

## 2022-11-18 DIAGNOSIS — N18.32 STAGE 3B CHRONIC KIDNEY DISEASE: ICD-10-CM

## 2022-11-18 LAB
ALBUMIN SERPL BCP-MCNC: 4.3 G/DL (ref 3.5–5.2)
ALP SERPL-CCNC: 48 U/L (ref 55–135)
ALT SERPL W/O P-5'-P-CCNC: 21 U/L (ref 10–44)
ANION GAP SERPL CALC-SCNC: 8 MMOL/L (ref 8–16)
AST SERPL-CCNC: 22 U/L (ref 10–40)
BASOPHILS # BLD AUTO: 0.02 K/UL (ref 0–0.2)
BASOPHILS NFR BLD: 0.4 % (ref 0–1.9)
BILIRUB SERPL-MCNC: 1.9 MG/DL (ref 0.1–1)
BUN SERPL-MCNC: 18 MG/DL (ref 8–23)
CALCIUM SERPL-MCNC: 10.2 MG/DL (ref 8.7–10.5)
CHLORIDE SERPL-SCNC: 109 MMOL/L (ref 95–110)
CO2 SERPL-SCNC: 25 MMOL/L (ref 23–29)
CREAT SERPL-MCNC: 1.5 MG/DL (ref 0.5–1.4)
DIFFERENTIAL METHOD: ABNORMAL
EOSINOPHIL # BLD AUTO: 0.3 K/UL (ref 0–0.5)
EOSINOPHIL NFR BLD: 4.8 % (ref 0–8)
ERYTHROCYTE [DISTWIDTH] IN BLOOD BY AUTOMATED COUNT: 14.7 % (ref 11.5–14.5)
EST. GFR  (NO RACE VARIABLE): 48.9 ML/MIN/1.73 M^2
ESTIMATED AVG GLUCOSE: 146 MG/DL (ref 68–131)
GLUCOSE SERPL-MCNC: 151 MG/DL (ref 70–110)
HBA1C MFR BLD: 6.7 % (ref 4–5.6)
HCT VFR BLD AUTO: 47.5 % (ref 40–54)
HGB BLD-MCNC: 14.9 G/DL (ref 14–18)
IMM GRANULOCYTES # BLD AUTO: 0 K/UL (ref 0–0.04)
IMM GRANULOCYTES NFR BLD AUTO: 0 % (ref 0–0.5)
LYMPHOCYTES # BLD AUTO: 1.8 K/UL (ref 1–4.8)
LYMPHOCYTES NFR BLD: 32 % (ref 18–48)
MCH RBC QN AUTO: 28.5 PG (ref 27–31)
MCHC RBC AUTO-ENTMCNC: 31.4 G/DL (ref 32–36)
MCV RBC AUTO: 91 FL (ref 82–98)
MONOCYTES # BLD AUTO: 0.7 K/UL (ref 0.3–1)
MONOCYTES NFR BLD: 12 % (ref 4–15)
NEUTROPHILS # BLD AUTO: 2.8 K/UL (ref 1.8–7.7)
NEUTROPHILS NFR BLD: 50.8 % (ref 38–73)
NRBC BLD-RTO: 0 /100 WBC
PLATELET # BLD AUTO: 212 K/UL (ref 150–450)
PMV BLD AUTO: 10.4 FL (ref 9.2–12.9)
POTASSIUM SERPL-SCNC: 4.2 MMOL/L (ref 3.5–5.1)
PROT SERPL-MCNC: 7.5 G/DL (ref 6–8.4)
RBC # BLD AUTO: 5.22 M/UL (ref 4.6–6.2)
SODIUM SERPL-SCNC: 142 MMOL/L (ref 136–145)
WBC # BLD AUTO: 5.59 K/UL (ref 3.9–12.7)

## 2022-11-18 PROCEDURE — 83036 HEMOGLOBIN GLYCOSYLATED A1C: CPT | Performed by: INTERNAL MEDICINE

## 2022-11-18 PROCEDURE — 85025 COMPLETE CBC W/AUTO DIFF WBC: CPT | Performed by: INTERNAL MEDICINE

## 2022-11-18 PROCEDURE — 80053 COMPREHEN METABOLIC PANEL: CPT | Performed by: INTERNAL MEDICINE

## 2022-11-18 PROCEDURE — 36415 COLL VENOUS BLD VENIPUNCTURE: CPT | Mod: PN | Performed by: INTERNAL MEDICINE

## 2022-11-23 ENCOUNTER — PATIENT OUTREACH (OUTPATIENT)
Dept: ADMINISTRATIVE | Facility: HOSPITAL | Age: 72
End: 2022-11-23
Payer: MEDICARE

## 2022-11-23 NOTE — PROGRESS NOTES
Health Maintenance Due   Topic Date Due    Shingles Vaccine (2 of 3) 06/20/2016     Triggered LINKS. Updated Care Everywhere. Imported outside diabetic eye exam results into . Chart review completed.

## 2022-12-02 ENCOUNTER — OFFICE VISIT (OUTPATIENT)
Dept: INTERNAL MEDICINE | Facility: CLINIC | Age: 72
End: 2022-12-02
Payer: MEDICARE

## 2022-12-02 VITALS
DIASTOLIC BLOOD PRESSURE: 78 MMHG | HEART RATE: 75 BPM | SYSTOLIC BLOOD PRESSURE: 130 MMHG | OXYGEN SATURATION: 99 % | HEIGHT: 71 IN | BODY MASS INDEX: 25.56 KG/M2 | WEIGHT: 182.56 LBS

## 2022-12-02 DIAGNOSIS — N18.31 TYPE 2 DIABETES MELLITUS WITH STAGE 3A CHRONIC KIDNEY DISEASE, WITHOUT LONG-TERM CURRENT USE OF INSULIN: ICD-10-CM

## 2022-12-02 DIAGNOSIS — Z87.891 H/O TOBACCO USE, PRESENTING HAZARDS TO HEALTH: Primary | ICD-10-CM

## 2022-12-02 DIAGNOSIS — J44.9 STAGE 2 MODERATE COPD BY GOLD CLASSIFICATION: ICD-10-CM

## 2022-12-02 DIAGNOSIS — N18.32 STAGE 3B CHRONIC KIDNEY DISEASE: ICD-10-CM

## 2022-12-02 DIAGNOSIS — E11.22 TYPE 2 DIABETES MELLITUS WITH STAGE 3A CHRONIC KIDNEY DISEASE, WITHOUT LONG-TERM CURRENT USE OF INSULIN: ICD-10-CM

## 2022-12-02 PROCEDURE — 99214 OFFICE O/P EST MOD 30 MIN: CPT | Mod: S$PBB,,, | Performed by: INTERNAL MEDICINE

## 2022-12-02 PROCEDURE — 99213 OFFICE O/P EST LOW 20 MIN: CPT | Mod: PBBFAC | Performed by: INTERNAL MEDICINE

## 2022-12-02 PROCEDURE — 99999 PR PBB SHADOW E&M-EST. PATIENT-LVL III: CPT | Mod: PBBFAC,,, | Performed by: INTERNAL MEDICINE

## 2022-12-02 PROCEDURE — 99999 PR PBB SHADOW E&M-EST. PATIENT-LVL III: ICD-10-PCS | Mod: PBBFAC,,, | Performed by: INTERNAL MEDICINE

## 2022-12-02 PROCEDURE — 99214 PR OFFICE/OUTPT VISIT, EST, LEVL IV, 30-39 MIN: ICD-10-PCS | Mod: S$PBB,,, | Performed by: INTERNAL MEDICINE

## 2022-12-02 RX ORDER — LATANOPROSTENE BUNOD 0.24 MG/ML
SOLUTION/ DROPS OPHTHALMIC
COMMUNITY
Start: 2022-10-24

## 2022-12-02 NOTE — PROGRESS NOTES
"  Mr. Hung Sorto is a 72  y.o. male here for an annual physical     1. DM: Patient now with HgBA1C 6.7    Amaryl 4 mg -- and   metformin to 1000  mg a day,  .   .Glucose was averaging 146  since last visit.   Lowest in 80's.   Reports eating oat meal, salad and sometimes indulging with fries. He denies polyuria, polydipsia and increased thirst. He denies any recent changes in vision, he also denies any numbness or tingling.He has gained 5  Lbs   since last time.  He has been inactive.        2. CKD: Patient has CKDIII, He renal function is  better since last time-( back to base line)   Cr 1.5  with GFR 39 ml/min.  He did have covid in May 2022.       3. COPD: Patient has COPD with a 50 pk year smoking history. He quit in 2017. He has an albuterol inhaler which he rarely uses. He denies any wheezing, SOB or dyspnea on exertion. 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  He did his Lung screening CT  In 7/2022-- he is due back 7/23.         4. HLD: He is currently on Atorvastatin 20mg. Current LDL 54 , HDL 40     5. Glaucoma: Patient follows with Opthalmology frequently Last in 9/2022. He denies any vision loss or blurry vision.              He otherwise has no other complaints today.     Review of Systems:      Constitutional: no fever or chills  Eyes: no visual changes  ENT: no nasal congestion or sore throat  Respiratory: no cough or shortness of breath  Cardiovascular: no chest pain or palpitations  Gastrointestinal: no nausea or vomiting, no abdominal pain or change in bowel habits  Genitourinary: no hematuria or dysuria  Musculoskeletal: no arthralgias or myalgias  Neurological: no seizures or tremors  Skin: No rashes      Review of patient's allergies indicates:  No Known Allergies     OBJECTIVE:   Vital Signs:     /78 (BP Location: Right arm, Patient Position: Sitting, BP Method: Medium (Manual))   Pulse 75   Ht 5' 11" (1.803 m)   Wt 82.8 kg (182 lb 8.7 oz)   SpO2 99%   " BMI 25.46 kg/m²           Physical Exam:   General:  Well developed, well nourished, no acute distress  Eyes: look a little red-- he has glaucoma and he says the eye drops cause this.    HEENT:  Normocephalic, atraumatic, PERRL, EOMI, clear sclera, throat clear without erythema or exudates  Neck: No carotid bruits. No thyromegaly.   CVS:  RRR, S1 and S2 normal, no murmurs, rubs, gallops  Resp:  Lungs clear to auscultation, no wheezes, rales, rhonchi, cough  GI:  Abdomen soft, non-tender, non-distended, normoactive bowel sounds, no masses  MSK:  No muscle atrophy, cyanosis, peripheral edema, full range of motion  Skin:  No rashes, ulcers, erythema-- sk on left breast--   Neuro:  CNII-XII grossly intact. No gross motor/sensory deficits  Psych:  Alert and oriented to person, place, and time                              ASSESSMENT & PLAN:            Diagnoses and all orders for this visit:     Polyp of colon, unspecified part of colon, unspecified type  Colonoscopy  Due in 10/2026     Screening for cancer  -     Diabetes mellitus without complication  -    -     Recommend continue diet modification  -       -     Return to clinic in 6  months for follow up.  Monitor for low glucose.       Stage 2 moderate COPD by GOLD classification        -     Continue Albuterol inhaler prn  CT Chest Lung Screening Low Dose - in Summer.       Stage 3 chronic kidney disease-- back to baseline  - stay hydrated and recheck in 6 month--      Elevated BP-- better  --          Multinodular goiter--we reviewed the  repeat thyroid us from march 2021--  he has not seen endo since 2014.

## 2023-01-27 ENCOUNTER — PES CALL (OUTPATIENT)
Dept: ADMINISTRATIVE | Facility: CLINIC | Age: 73
End: 2023-01-27
Payer: MEDICARE

## 2023-02-05 NOTE — TELEPHONE ENCOUNTER
No new care gaps identified.  [facility] Embedded Care Gaps. Reference number: [id number]. 2/05/2023   1:47:15 PM CST

## 2023-02-06 RX ORDER — METFORMIN HYDROCHLORIDE 500 MG/1
1000 TABLET ORAL
Qty: 180 TABLET | Refills: 3 | Status: SHIPPED | OUTPATIENT
Start: 2023-02-06 | End: 2023-11-06

## 2023-02-06 RX ORDER — ATORVASTATIN CALCIUM 20 MG/1
20 TABLET, FILM COATED ORAL DAILY
Qty: 90 TABLET | Refills: 1 | Status: SHIPPED | OUTPATIENT
Start: 2023-02-06 | End: 2023-07-30

## 2023-02-06 RX ORDER — GLIMEPIRIDE 4 MG/1
4 TABLET ORAL
Qty: 90 TABLET | Refills: 3 | Status: SHIPPED | OUTPATIENT
Start: 2023-02-06 | End: 2024-01-08

## 2023-02-07 RX ORDER — GLIMEPIRIDE 4 MG/1
TABLET ORAL
Qty: 90 TABLET | Refills: 3 | OUTPATIENT
Start: 2023-02-07

## 2023-02-07 NOTE — TELEPHONE ENCOUNTER
No new care gaps identified.  [facility] Embedded Care Gaps. Reference number: [id number]. 2/06/2023   11:50:22 PM CST

## 2023-02-07 NOTE — TELEPHONE ENCOUNTER
Willis DC. Request already responded to by other means (e.g. phone or fax)   Refill Authorization Note   Hung Sorto  is requesting a refill authorization.  Brief Assessment and Rationale for Refill:  Quick Discontinue  Medication Therapy Plan:  Pt get all meds from [facility]. Script sent to [facility] on 2/6/23    Medication Reconciliation Completed:  No      Comments:     Note composed:10:03 AM 02/07/2023

## 2023-02-14 ENCOUNTER — OFFICE VISIT (OUTPATIENT)
Dept: INTERNAL MEDICINE | Facility: CLINIC | Age: 73
End: 2023-02-14
Payer: MEDICARE

## 2023-02-14 VITALS — OXYGEN SATURATION: 97 % | DIASTOLIC BLOOD PRESSURE: 80 MMHG | HEART RATE: 57 BPM | SYSTOLIC BLOOD PRESSURE: 140 MMHG

## 2023-02-14 DIAGNOSIS — E21.3 HYPERPARATHYROIDISM: ICD-10-CM

## 2023-02-14 DIAGNOSIS — Z00.00 ENCOUNTER FOR PREVENTIVE HEALTH EXAMINATION: Primary | ICD-10-CM

## 2023-02-14 DIAGNOSIS — N18.30 STAGE 3 CHRONIC KIDNEY DISEASE, UNSPECIFIED WHETHER STAGE 3A OR 3B CKD: ICD-10-CM

## 2023-02-14 DIAGNOSIS — J44.9 STAGE 2 MODERATE COPD BY GOLD CLASSIFICATION: ICD-10-CM

## 2023-02-14 DIAGNOSIS — E11.22 TYPE 2 DIABETES MELLITUS WITH STAGE 3A CHRONIC KIDNEY DISEASE, WITHOUT LONG-TERM CURRENT USE OF INSULIN: ICD-10-CM

## 2023-02-14 DIAGNOSIS — N18.31 TYPE 2 DIABETES MELLITUS WITH STAGE 3A CHRONIC KIDNEY DISEASE, WITHOUT LONG-TERM CURRENT USE OF INSULIN: ICD-10-CM

## 2023-02-14 DIAGNOSIS — Z87.891 HISTORY OF TOBACCO USE: ICD-10-CM

## 2023-02-14 PROCEDURE — G0439 PPPS, SUBSEQ VISIT: HCPCS | Mod: ,,, | Performed by: NURSE PRACTITIONER

## 2023-02-14 PROCEDURE — G0439 PR MEDICARE ANNUAL WELLNESS SUBSEQUENT VISIT: ICD-10-PCS | Mod: ,,, | Performed by: NURSE PRACTITIONER

## 2023-02-14 PROCEDURE — 99999 PR PBB SHADOW E&M-EST. PATIENT-LVL III: CPT | Mod: PBBFAC,,, | Performed by: NURSE PRACTITIONER

## 2023-02-14 PROCEDURE — 99999 PR PBB SHADOW E&M-EST. PATIENT-LVL III: ICD-10-PCS | Mod: PBBFAC,,, | Performed by: NURSE PRACTITIONER

## 2023-02-14 PROCEDURE — 99213 OFFICE O/P EST LOW 20 MIN: CPT | Mod: PBBFAC | Performed by: NURSE PRACTITIONER

## 2023-02-14 NOTE — PROGRESS NOTES
Hung Sorto presented for a  Medicare AWV and comprehensive Health Risk Assessment today. The following components were reviewed and updated:    Medical history  Family History  Social history  Allergies and Current Medications  Health Risk Assessment  Health Maintenance  Care Team         ** See Completed Assessments for Annual Wellness Visit within the encounter summary.**         The following assessments were completed:  Living Situation  CAGE  Depression Screening  Timed Get Up and Go  Whisper Test  Cognitive Function Screening  Nutrition Screening  ADL Screening  PAQ Screening        Vitals:    02/14/23 0912 02/14/23 0943   BP: (!) 150/80 (!) 140/80   Pulse: (!) 57    SpO2: 97%      There is no height or weight on file to calculate BMI.  Physical Exam  Vitals and nursing note reviewed.   Constitutional:       Appearance: He is well-developed.   HENT:      Head: Normocephalic and atraumatic.      Right Ear: External ear normal.      Left Ear: External ear normal.   Eyes:      Conjunctiva/sclera: Conjunctivae normal.      Pupils: Pupils are equal, round, and reactive to light.   Cardiovascular:      Rate and Rhythm: Normal rate and regular rhythm.   Pulmonary:      Effort: Pulmonary effort is normal.      Breath sounds: Normal breath sounds.   Abdominal:      General: Bowel sounds are normal.      Palpations: Abdomen is soft.   Musculoskeletal:         General: Normal range of motion.      Cervical back: Normal range of motion and neck supple.   Skin:     General: Skin is warm and dry.   Neurological:      Mental Status: He is alert and oriented to person, place, and time.             Diagnoses and health risks identified today and associated recommendations/orders:    1. Encounter for preventive health examination  Health Maintenance updated   Records reviewed   Exam done     2. Type 2 diabetes mellitus with stage 3a chronic kidney disease, without long-term current use of insulin  Stable and chronic.  Continue current medications. Followed by PCP.     3. Hyperparathyroidism  Stable and chronic. Followed by PCP.     4. Stage 2 moderate COPD by GOLD classification  Stable and chronic. Continue current medications. Followed by PCP.     5. History of tobacco use  Stable and chronic. Followed by PCP.     6. Stage 3 chronic kidney disease, unspecified whether stage 3a or 3b CKD  Stable and chronic. Followed by PCP.     Counseling and Referral of Other Preventative  (Italic type indicates deductible and co-insurance are waived)    Patient Name: Hung Sorto  Today's Date: 2/14/2023    Health Maintenance         Date Due Completion Date    Shingles Vaccine (2 of 3) 06/20/2016 4/25/2016    Hemoglobin A1c 05/18/2023 11/18/2022    Lipid Panel 06/27/2023 6/27/2022    Foot Exam 07/01/2023 7/1/2022    Override on 7/1/2022: Done    Override on 5/17/2021: Done    Override on 2/26/2019: Done    LDCT Lung Screen 07/06/2023 7/6/2022    Eye Exam 09/12/2023 9/12/2022    Override on 12/8/2021: Done (Dr Encinas- around [facility] --)    Override on 1/16/2019: Done (outsside)    Diabetes Urine Screening 11/18/2023 11/18/2022    Low Dose Statin 02/14/2024 2/14/2023    TETANUS VACCINE 06/27/2026 6/27/2016    Colorectal Cancer Screening 10/28/2026 10/28/2016          No orders of the defined types were placed in this encounter.      Provided Hung with a 5-10 year written screening schedule and personal prevention plan. Recommendations were developed using the USPSTF age appropriate recommendations. Education, counseling, and referrals were provided as needed. After Visit Summary printed and given to patient which includes a list of additional screenings\tests needed.    Follow up in about 4 months (around 6/28/2023) for Follow up with PCP.    Deanna Cleveland NP  I offered to discuss advanced care planning, including how to pick a person who would make decisions for you if you were unable to make them for yourself, called a health care  power of , and what kind of decisions you might make such as use of life sustaining treatments such as ventilators and tube feeding when faced with a life limiting illness recorded on a living will that they will need to know. (How you want to be cared for as you near the end of your natural life)     X Patient is interested in learning more about how to make advanced directives.  I provided them paperwork and offered to discuss this with them.  Review for Opioid Screening: Pt does not have Rx for Opioids     Review for Substance Use Disorders: Patient does not use substance

## 2023-02-14 NOTE — PATIENT INSTRUCTIONS
Counseling and Referral of Other Preventative  (Italic type indicates deductible and co-insurance are waived)    Patient Name: Hung Sorto  Today's Date: 2/14/2023    Health Maintenance       Date Due Completion Date    Shingles Vaccine (2 of 3) 06/20/2016 4/25/2016    Hemoglobin A1c 05/18/2023 11/18/2022    Lipid Panel 06/27/2023 6/27/2022    Foot Exam 07/01/2023 7/1/2022    Override on 7/1/2022: Done    Override on 5/17/2021: Done    Override on 2/26/2019: Done    LDCT Lung Screen 07/06/2023 7/6/2022    Eye Exam 09/12/2023 9/12/2022    Override on 12/8/2021: Done (Dr Encinas- around [facility] --)    Override on 1/16/2019: Done (outsside)    Diabetes Urine Screening 11/18/2023 11/18/2022    Low Dose Statin 02/06/2024 2/6/2023    TETANUS VACCINE 06/27/2026 6/27/2016    Colorectal Cancer Screening 10/28/2026 10/28/2016        No orders of the defined types were placed in this encounter.      The following information is provided to all patients.  This information is to help you find resources for any of the problems found today that may be affecting your health:                Living healthy guide: www.[facility].[place].gov      Understanding Diabetes: www.diabetes.org      Eating healthy: www.cdc.gov/healthyweight      CDC home safety checklist: www.cdc.gov/steadi/patient.html      Agency on Aging: www.goea.[place].[facility]      Alcoholics anonymous (AA): www.aa.org      Physical Activity: www.fausto.nih.gov/ef2vnjz      Tobacco use: www.quitwithusla.org

## 2023-02-25 ENCOUNTER — OFFICE VISIT (OUTPATIENT)
Dept: URGENT CARE | Facility: CLINIC | Age: 73
End: 2023-02-25
Payer: MEDICARE

## 2023-02-25 VITALS
SYSTOLIC BLOOD PRESSURE: 149 MMHG | HEART RATE: 67 BPM | BODY MASS INDEX: 25.48 KG/M2 | HEIGHT: 71 IN | RESPIRATION RATE: 16 BRPM | OXYGEN SATURATION: 99 % | WEIGHT: 182 LBS | DIASTOLIC BLOOD PRESSURE: 80 MMHG | TEMPERATURE: 98 F

## 2023-02-25 DIAGNOSIS — J06.9 VIRAL URI WITH COUGH: Primary | ICD-10-CM

## 2023-02-25 DIAGNOSIS — R05.9 COUGH, UNSPECIFIED TYPE: ICD-10-CM

## 2023-02-25 LAB
CTP QC/QA: YES
SARS-COV-2 AG RESP QL IA.RAPID: NEGATIVE

## 2023-02-25 PROCEDURE — 99214 PR OFFICE/OUTPT VISIT, EST, LEVL IV, 30-39 MIN: ICD-10-PCS | Mod: S$GLB,,, | Performed by: NURSE PRACTITIONER

## 2023-02-25 PROCEDURE — 87811 SARS CORONAVIRUS 2 ANTIGEN POCT, MANUAL READ: ICD-10-PCS | Mod: QW,CR,S$GLB, | Performed by: NURSE PRACTITIONER

## 2023-02-25 PROCEDURE — 99214 OFFICE O/P EST MOD 30 MIN: CPT | Mod: S$GLB,,, | Performed by: NURSE PRACTITIONER

## 2023-02-25 PROCEDURE — 87811 SARS-COV-2 COVID19 W/OPTIC: CPT | Mod: QW,CR,S$GLB, | Performed by: NURSE PRACTITIONER

## 2023-02-25 RX ORDER — PROMETHAZINE HYDROCHLORIDE AND DEXTROMETHORPHAN HYDROBROMIDE 6.25; 15 MG/5ML; MG/5ML
5 SYRUP ORAL NIGHTLY PRN
Qty: 180 ML | Refills: 0 | Status: SHIPPED | OUTPATIENT
Start: 2023-02-25 | End: 2023-12-11

## 2023-02-25 RX ORDER — BENZONATATE 200 MG/1
200 CAPSULE ORAL 3 TIMES DAILY PRN
Qty: 30 CAPSULE | Refills: 0 | Status: SHIPPED | OUTPATIENT
Start: 2023-02-25 | End: 2023-03-07

## 2023-02-25 NOTE — PROGRESS NOTES
"Subjective:       Patient ID: Hung Sorto Jr. is a 72 y.o. male.    Vitals:  height is 5' 11" (1.803 m) and weight is 82.6 kg (182 lb). His oral temperature is 98 °F (36.7 °C). His blood pressure is 149/80 (abnormal) and his pulse is 67. His respiration is 16 and oxygen saturation is 99%.     Chief Complaint: Sinus Problem    Patient is a 73 yo male who reports to the clinic with the compliant of a sore throat, cough, sinus pressure, and congestion that presented a bout a week ago, patient has taken tylenol, oral decongestants, and nasal sprays, however he reports minimal relief from his symptoms. Denies fever. His wife is also sick. Pt is vaccinated for COVID.     Sinus Problem  This is a new problem. The current episode started in the past 7 days. The problem is unchanged. There has been no fever. His pain is at a severity of 3/10. The pain is mild. Associated symptoms include congestion, coughing, headaches, a hoarse voice, sinus pressure and a sore throat. Past treatments include acetaminophen, oral decongestants and saline nose sprays. The treatment provided no relief.     HENT:  Positive for congestion, sinus pressure and sore throat.    Respiratory:  Positive for cough.    Neurological:  Positive for headaches.     Objective:      Physical Exam   Constitutional: He is oriented to person, place, and time. He appears well-developed. He is cooperative.  Non-toxic appearance. He does not appear ill. No distress.   HENT:   Head: Normocephalic and atraumatic.   Ears:   Right Ear: Hearing, tympanic membrane, external ear and ear canal normal.   Left Ear: Hearing, tympanic membrane, external ear and ear canal normal.   Nose: Nose normal. No mucosal edema, rhinorrhea or nasal deformity. No epistaxis. Right sinus exhibits no maxillary sinus tenderness and no frontal sinus tenderness. Left sinus exhibits no maxillary sinus tenderness and no frontal sinus tenderness.   Mouth/Throat: Uvula is midline, oropharynx is " clear and moist and mucous membranes are normal. No trismus in the jaw. Normal dentition. No uvula swelling. No oropharyngeal exudate, posterior oropharyngeal edema or posterior oropharyngeal erythema.   Eyes: Conjunctivae and lids are normal. No scleral icterus.   Neck: Trachea normal and phonation normal. Neck supple. No edema present. No erythema present. No neck rigidity present.   Cardiovascular: Normal rate, regular rhythm, normal heart sounds and normal pulses.   Pulmonary/Chest: Effort normal and breath sounds normal. No respiratory distress. He has no decreased breath sounds. He has no wheezes. He has no rhonchi.   Abdominal: Normal appearance.   Musculoskeletal: Normal range of motion.         General: No deformity. Normal range of motion.   Neurological: He is alert and oriented to person, place, and time. He exhibits normal muscle tone. Coordination normal.   Skin: Skin is warm, dry, intact, not diaphoretic and not pale.   Psychiatric: His speech is normal and behavior is normal. Judgment and thought content normal.   Nursing note and vitals reviewed.    Results for orders placed or performed in visit on 02/25/23   SARS Coronavirus 2 Antigen, POCT Manual Read   Result Value Ref Range    SARS Coronavirus 2 Antigen Negative Negative     Acceptable Yes            Assessment:       1. Viral URI with cough    2. Cough, unspecified type          Plan:         Viral URI with cough    Cough, unspecified type  -     SARS Coronavirus 2 Antigen, POCT Manual Read  -     benzonatate (TESSALON) 200 MG capsule; Take 1 capsule (200 mg total) by mouth 3 (three) times daily as needed for Cough.  Dispense: 30 capsule; Refill: 0  -     promethazine-dextromethorphan (PROMETHAZINE-DM) 6.25-15 mg/5 mL Syrp; Take 5 mLs by mouth nightly as needed (coug).  Dispense: 180 mL; Refill: 0         Patient Instructions   - You must understand that you have received an Urgent Care treatment only and that you may be  released before all of your medical problems are known or treated.   - You, the patient, will arrange for follow up care as instructed.   - If your condition worsens or fails to improve we recommend that you receive another evaluation at the ER immediately or contact your PCP to discuss your concerns.   - You can call [phone] or [phone] to help schedule an appointment with the appropriate provider.    Drink plenty of fluids   Get lots of rest  Tylenol or ibuprofen for pain/fever  Mucinex DM for daytime cough, this medication may be taken with the benzonatate, but do not take it with promethazine-DM.  Prescription cough syrup for night time cough. This medication will make you drowsy. Do not drink alcohol or  Operate machinery while taking this medicine.   Saline nasal rinses to irrigate sinus cavities  Warm salt water gargles for sore throat

## 2023-02-25 NOTE — PATIENT INSTRUCTIONS
- You must understand that you have received an Urgent Care treatment only and that you may be released before all of your medical problems are known or treated.   - You, the patient, will arrange for follow up care as instructed.   - If your condition worsens or fails to improve we recommend that you receive another evaluation at the ER immediately or contact your PCP to discuss your concerns.   - You can call [phone] or [phone] to help schedule an appointment with the appropriate provider.    Drink plenty of fluids   Get lots of rest  Tylenol or ibuprofen for pain/fever  Mucinex DM for daytime cough, this medication may be taken with the benzonatate, but do not take it with promethazine-DM.  Prescription cough syrup for night time cough. This medication will make you drowsy. Do not drink alcohol or  Operate machinery while taking this medicine.   Saline nasal rinses to irrigate sinus cavities  Warm salt water gargles for sore throat

## 2023-05-29 ENCOUNTER — LAB VISIT (OUTPATIENT)
Dept: LAB | Facility: HOSPITAL | Age: 73
End: 2023-05-29
Attending: INTERNAL MEDICINE
Payer: MEDICARE

## 2023-05-29 DIAGNOSIS — N18.31 TYPE 2 DIABETES MELLITUS WITH STAGE 3A CHRONIC KIDNEY DISEASE, WITHOUT LONG-TERM CURRENT USE OF INSULIN: ICD-10-CM

## 2023-05-29 DIAGNOSIS — E11.22 TYPE 2 DIABETES MELLITUS WITH STAGE 3A CHRONIC KIDNEY DISEASE, WITHOUT LONG-TERM CURRENT USE OF INSULIN: ICD-10-CM

## 2023-05-29 LAB
ALBUMIN SERPL BCP-MCNC: 3.9 G/DL (ref 3.5–5.2)
ALP SERPL-CCNC: 53 U/L (ref 55–135)
ALT SERPL W/O P-5'-P-CCNC: 26 U/L (ref 10–44)
ANION GAP SERPL CALC-SCNC: 4 MMOL/L (ref 8–16)
AST SERPL-CCNC: 22 U/L (ref 10–40)
BILIRUB SERPL-MCNC: 1 MG/DL (ref 0.1–1)
BUN SERPL-MCNC: 21 MG/DL (ref 8–23)
CALCIUM SERPL-MCNC: 9.9 MG/DL (ref 8.7–10.5)
CHLORIDE SERPL-SCNC: 110 MMOL/L (ref 95–110)
CHOLEST SERPL-MCNC: 100 MG/DL (ref 120–199)
CHOLEST/HDLC SERPL: 2.6 {RATIO} (ref 2–5)
CO2 SERPL-SCNC: 26 MMOL/L (ref 23–29)
CREAT SERPL-MCNC: 1.6 MG/DL (ref 0.5–1.4)
EST. GFR  (NO RACE VARIABLE): 44.9 ML/MIN/1.73 M^2
ESTIMATED AVG GLUCOSE: 151 MG/DL (ref 68–131)
GLUCOSE SERPL-MCNC: 159 MG/DL (ref 70–110)
HBA1C MFR BLD: 6.9 % (ref 4–5.6)
HDLC SERPL-MCNC: 38 MG/DL (ref 40–75)
HDLC SERPL: 38 % (ref 20–50)
LDLC SERPL CALC-MCNC: 49.4 MG/DL (ref 63–159)
NONHDLC SERPL-MCNC: 62 MG/DL
POTASSIUM SERPL-SCNC: 4.4 MMOL/L (ref 3.5–5.1)
PROT SERPL-MCNC: 6.8 G/DL (ref 6–8.4)
SODIUM SERPL-SCNC: 140 MMOL/L (ref 136–145)
TRIGL SERPL-MCNC: 63 MG/DL (ref 30–150)

## 2023-05-29 PROCEDURE — 80053 COMPREHEN METABOLIC PANEL: CPT | Performed by: INTERNAL MEDICINE

## 2023-05-29 PROCEDURE — 83036 HEMOGLOBIN GLYCOSYLATED A1C: CPT | Performed by: INTERNAL MEDICINE

## 2023-05-29 PROCEDURE — 80061 LIPID PANEL: CPT | Performed by: INTERNAL MEDICINE

## 2023-05-29 PROCEDURE — 36415 COLL VENOUS BLD VENIPUNCTURE: CPT | Mod: PN | Performed by: INTERNAL MEDICINE

## 2023-06-02 ENCOUNTER — PATIENT MESSAGE (OUTPATIENT)
Dept: PHARMACY | Facility: CLINIC | Age: 73
End: 2023-06-02
Payer: MEDICARE

## 2023-06-02 ENCOUNTER — OFFICE VISIT (OUTPATIENT)
Dept: INTERNAL MEDICINE | Facility: CLINIC | Age: 73
End: 2023-06-02
Payer: MEDICARE

## 2023-06-02 VITALS
BODY MASS INDEX: 25.31 KG/M2 | HEART RATE: 68 BPM | OXYGEN SATURATION: 99 % | HEIGHT: 71 IN | WEIGHT: 180.75 LBS | DIASTOLIC BLOOD PRESSURE: 76 MMHG | SYSTOLIC BLOOD PRESSURE: 136 MMHG

## 2023-06-02 DIAGNOSIS — N18.32 STAGE 3B CHRONIC KIDNEY DISEASE: ICD-10-CM

## 2023-06-02 DIAGNOSIS — J44.9 STAGE 2 MODERATE COPD BY GOLD CLASSIFICATION: ICD-10-CM

## 2023-06-02 DIAGNOSIS — E08.22 DIABETES MELLITUS DUE TO UNDERLYING CONDITION WITH STAGE 3B CHRONIC KIDNEY DISEASE, WITHOUT LONG-TERM CURRENT USE OF INSULIN: ICD-10-CM

## 2023-06-02 DIAGNOSIS — Z87.891 H/O TOBACCO USE, PRESENTING HAZARDS TO HEALTH: ICD-10-CM

## 2023-06-02 DIAGNOSIS — N18.32 DIABETES MELLITUS DUE TO UNDERLYING CONDITION WITH STAGE 3B CHRONIC KIDNEY DISEASE, WITHOUT LONG-TERM CURRENT USE OF INSULIN: ICD-10-CM

## 2023-06-02 DIAGNOSIS — E04.2 MULTINODULAR NON-TOXIC GOITER: ICD-10-CM

## 2023-06-02 DIAGNOSIS — K63.5 POLYP OF COLON, UNSPECIFIED PART OF COLON, UNSPECIFIED TYPE: Primary | ICD-10-CM

## 2023-06-02 PROCEDURE — 99999 PR PBB SHADOW E&M-EST. PATIENT-LVL III: CPT | Mod: PBBFAC,,, | Performed by: INTERNAL MEDICINE

## 2023-06-02 PROCEDURE — 99999 PR PBB SHADOW E&M-EST. PATIENT-LVL III: ICD-10-PCS | Mod: PBBFAC,,, | Performed by: INTERNAL MEDICINE

## 2023-06-02 PROCEDURE — 99214 PR OFFICE/OUTPT VISIT, EST, LEVL IV, 30-39 MIN: ICD-10-PCS | Mod: S$PBB,,, | Performed by: INTERNAL MEDICINE

## 2023-06-02 PROCEDURE — 99214 OFFICE O/P EST MOD 30 MIN: CPT | Mod: S$PBB,,, | Performed by: INTERNAL MEDICINE

## 2023-06-02 PROCEDURE — 99213 OFFICE O/P EST LOW 20 MIN: CPT | Mod: PBBFAC | Performed by: INTERNAL MEDICINE

## 2023-06-02 NOTE — PROGRESS NOTES
"  Mr. Hung Sorto is a 73  y.o. male here forfollow up of medical problems.       1. DM: Patient now with HgBA1C 6.7    Amaryl 4 mg -- and   metformin to 1000  mg a day,  .   .Glucose was averaging 151  since last visit.   Lowest in 80's.   Reports eating oat meal, salad and sometimes indulging with fries. He denies polyuria, polydipsia and increased thirst. He denies any recent changes in vision, he also denies any numbness or tingling.He has lost 2  Lbs   since last time.  He has been inactive.        2. CKD: Patient has CKDIIIB, He renal function is  better since last time-( back to base line)   Cr 1.6  with GFR 44.9 ml/min.  He did have covid in May 2022.       3. COPD: Patient has COPD with a 50 pk year smoking history. He quit in 2017. He has an albuterol inhaler which he rarely uses. He denies any wheezing, SOB or dyspnea on exertion. 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  He did his Lung screening CT  In 7/2022-- he is due back 7/23.         4. HLD: He is currently on Atorvastatin 20mg. Current LDL 54 , HDL 40     5. Glaucoma: Patient follows with Opthalmology frequently - every 3 months -- he has follow up in couple weeks.   He denies any vision loss or blurry vision.              He otherwise has no other complaints today.     Review of Systems:      Constitutional: no fever or chills  Eyes: no visual changes  ENT: no nasal congestion or sore throat  Respiratory: no cough or shortness of breath  Cardiovascular: no chest pain or palpitations  Gastrointestinal: no nausea or vomiting, no abdominal pain or change in bowel habits  Genitourinary: no hematuria or dysuria  Musculoskeletal: no arthralgias or myalgias  Neurological: no seizures or tremors  Skin: No rashes      Review of patient's allergies indicates:  No Known Allergies     OBJECTIVE:   Vital Signs:       /76 (BP Location: Left arm, Patient Position: Sitting, BP Method: Medium (Manual))   Pulse 68   Ht 5' 11" " (1.803 m)   Wt 82 kg (180 lb 12.4 oz)   SpO2 99%   BMI 25.21 kg/m²              Physical Exam:   General:  Well developed, well nourished, no acute distress  Eyes: look a little red-- he has glaucoma and he says the eye drops cause this.    HEENT:  Normocephalic, atraumatic, PERRL, EOMI, clear sclera, throat clear without erythema or exudates  Neck: No carotid bruits. No thyromegaly.   CVS:  RRR, S1 and S2 normal, no murmurs, rubs, gallops  Resp:  Lungs clear to auscultation, no wheezes, rales, rhonchi, cough  GI:  Abdomen soft, non-tender, non-distended, normoactive bowel sounds, no masses  MSK:  No muscle atrophy, cyanosis, peripheral edema, full range of motion  Skin:  No rashes, ulcers, erythema-- sk on left breast--   Neuro:  CNII-XII grossly intact. No gross motor/sensory deficits  Psych:  Alert and oriented to person, place, and time       Protective Sensation (w/ 10 gram monofilament):  Right: Intact  Left: Intact    Visual Inspection:  Normal -  Bilateral    Pedal Pulses:   Right: Present  Left: Present    Posterior Tibialis Pulses:   Right:Present  Left: Present         ASSESSMENT & PLAN:            Diagnoses and all orders for this visit:     Polyp of colon, unspecified part of colon, unspecified type  Colonoscopy  Due in 10/2026     Screening for cancer  -     Diabetes mellitus without complication  -    -     Recommend continue diet modification  -       -     Return to clinic in 6  months for follow up.  Monitor for low glucose.       Stage 2 moderate COPD by GOLD classification        -     Continue Albuterol inhaler prn  CT Chest Lung Screening Low Dose - in Summer.       Stage 3 B--chronic kidney disease-- back to baseline  - stay hydrated and recheck in 6 month--        Lipid panel reviewed    He has chronically elevated total bilirubin- probably [place] disease      Discussed covid vaccine- will get in Sept and flu at same time--Will get him hte new Shingles.          Multinodular goiter--we  reviewed the  repeat thyroid us from march 2021--  he has not seen endo since 2014.  -- will recheck thyroid US

## 2023-06-26 ENCOUNTER — HOSPITAL ENCOUNTER (OUTPATIENT)
Dept: RADIOLOGY | Facility: HOSPITAL | Age: 73
Discharge: HOME | End: 2023-06-26
Attending: INTERNAL MEDICINE
Payer: MEDICARE

## 2023-06-26 ENCOUNTER — TELEPHONE (OUTPATIENT)
Dept: INTERNAL MEDICINE | Facility: CLINIC | Age: 73
End: 2023-06-26
Payer: MEDICARE

## 2023-06-26 DIAGNOSIS — E04.2 MULTINODULAR NON-TOXIC GOITER: ICD-10-CM

## 2023-06-26 PROCEDURE — 76536 US EXAM OF HEAD AND NECK: CPT | Mod: TC

## 2023-06-26 PROCEDURE — 76536 US SOFT TISSUE HEAD NECK THYROID: ICD-10-PCS | Mod: 26,,, | Performed by: STUDENT IN AN ORGANIZED HEALTH CARE EDUCATION/TRAINING PROGRAM

## 2023-06-26 PROCEDURE — 76536 US EXAM OF HEAD AND NECK: CPT | Mod: 26,,, | Performed by: STUDENT IN AN ORGANIZED HEALTH CARE EDUCATION/TRAINING PROGRAM

## 2023-07-03 ENCOUNTER — HOSPITAL ENCOUNTER (OUTPATIENT)
Dept: RADIOLOGY | Facility: HOSPITAL | Age: 73
Discharge: HOME | End: 2023-07-03
Attending: INTERNAL MEDICINE
Payer: MEDICARE

## 2023-07-03 DIAGNOSIS — Z87.891 H/O TOBACCO USE, PRESENTING HAZARDS TO HEALTH: ICD-10-CM

## 2023-07-03 PROCEDURE — 71271 CT CHEST LUNG SCREENING LOW DOSE: ICD-10-PCS | Mod: 26,,, | Performed by: STUDENT IN AN ORGANIZED HEALTH CARE EDUCATION/TRAINING PROGRAM

## 2023-07-03 PROCEDURE — 71271 CT THORAX LUNG CANCER SCR C-: CPT | Mod: 26,,, | Performed by: STUDENT IN AN ORGANIZED HEALTH CARE EDUCATION/TRAINING PROGRAM

## 2023-07-03 PROCEDURE — 71271 CT THORAX LUNG CANCER SCR C-: CPT | Mod: TC

## 2023-07-30 RX ORDER — ATORVASTATIN CALCIUM 20 MG/1
20 TABLET, FILM COATED ORAL
Qty: 90 TABLET | Refills: 3 | Status: SHIPPED | OUTPATIENT
Start: 2023-07-30

## 2023-07-30 NOTE — TELEPHONE ENCOUNTER
No care due was identified.  [facility] Embedded Care Due Messages. Reference number: [id number].   7/30/2023 4:54:27 AM CDT

## 2023-07-31 NOTE — TELEPHONE ENCOUNTER
Refill Decision Note   Hung Macario  is requesting a refill authorization.  Brief Assessment and Rationale for Refill:  Approve     Medication Therapy Plan:         Comments:     Note composed:7:53 PM 07/30/2023

## 2023-11-02 NOTE — TELEPHONE ENCOUNTER
Care Due:                  Date            Visit Type   Department     Provider  --------------------------------------------------------------------------------                                EP -                              PRIMARY      [facility] INTERNAL  Last Visit: 06-      CARE ([facility])   JOSE MARIA Gregory                              [facility]                              PRIMARY      [facility] INTERNAL  Next Visit: 12-      CARE ([facility])   MEDICINE       Tacos Gregory                                                            Last  Test          Frequency    Reason                     Performed    Due Date  --------------------------------------------------------------------------------    HBA1C.......  6 months...  glimepiride, metFORMIN...  05- 11-    [facility] Embedded Care Due Messages. Reference number: [id number].   11/02/2023 8:06:22 AM CDT

## 2023-11-02 NOTE — TELEPHONE ENCOUNTER
Refill Routing Note   Medication(s) are not appropriate for processing by [facility] for the following reason(s):      Required labs outdated    [facility] action(s):  Defer Labs due          Pharmacist review requested: Yes     Appointments  past 12m or future 3m with PCP    Date Provider   Last Visit   6/2/2023 Tacos Gregory Jr., MD   Next Visit   12/4/2023 Tacos Gregory Jr., MD   ED visits in past 90 days: 0        Note composed:2:11 PM 11/02/2023

## 2023-11-03 NOTE — TELEPHONE ENCOUNTER
Refill Routing Note   Medication(s) are not appropriate for processing by [facility] for the following reason(s):      Required labs abnormal    [facility] action(s):  Defer Care Due:  None identified     Medication Therapy Plan: FLOS    Pharmacist review requested: Yes   Extended chart review required: Yes     Appointments  past 12m or future 3m with PCP    Date Provider   Last Visit   6/2/2023 Tacos Gregory Jr., MD   Next Visit   12/4/2023 Tacos Gregory Jr., MD   ED visits in past 90 days: 0        Note composed:3:14 AM 11/03/2023

## 2023-11-06 RX ORDER — METFORMIN HYDROCHLORIDE 500 MG/1
TABLET ORAL
Qty: 180 TABLET | Refills: 0 | Status: SHIPPED | OUTPATIENT
Start: 2023-11-06

## 2023-11-27 ENCOUNTER — LAB VISIT (OUTPATIENT)
Dept: LAB | Facility: HOSPITAL | Age: 73
End: 2023-11-27
Attending: INTERNAL MEDICINE
Payer: MEDICARE

## 2023-11-27 DIAGNOSIS — N18.32 DIABETES MELLITUS DUE TO UNDERLYING CONDITION WITH STAGE 3B CHRONIC KIDNEY DISEASE, WITHOUT LONG-TERM CURRENT USE OF INSULIN: ICD-10-CM

## 2023-11-27 DIAGNOSIS — E04.2 MULTINODULAR NON-TOXIC GOITER: ICD-10-CM

## 2023-11-27 DIAGNOSIS — E08.22 DIABETES MELLITUS DUE TO UNDERLYING CONDITION WITH STAGE 3B CHRONIC KIDNEY DISEASE, WITHOUT LONG-TERM CURRENT USE OF INSULIN: ICD-10-CM

## 2023-11-27 LAB
ALBUMIN SERPL BCP-MCNC: 4 G/DL (ref 3.5–5.2)
ALP SERPL-CCNC: 45 U/L (ref 55–135)
ALT SERPL W/O P-5'-P-CCNC: 21 U/L (ref 10–44)
ANION GAP SERPL CALC-SCNC: 7 MMOL/L (ref 8–16)
AST SERPL-CCNC: 21 U/L (ref 10–40)
BILIRUB SERPL-MCNC: 1.2 MG/DL (ref 0.1–1)
BUN SERPL-MCNC: 16 MG/DL (ref 8–23)
CALCIUM SERPL-MCNC: 10.1 MG/DL (ref 8.7–10.5)
CHLORIDE SERPL-SCNC: 110 MMOL/L (ref 95–110)
CO2 SERPL-SCNC: 26 MMOL/L (ref 23–29)
CREAT SERPL-MCNC: 1.6 MG/DL (ref 0.5–1.4)
EST. GFR  (NO RACE VARIABLE): 44.9 ML/MIN/1.73 M^2
ESTIMATED AVG GLUCOSE: 160 MG/DL (ref 68–131)
GLUCOSE SERPL-MCNC: 142 MG/DL (ref 70–110)
HBA1C MFR BLD: 7.2 % (ref 4–5.6)
POTASSIUM SERPL-SCNC: 4.4 MMOL/L (ref 3.5–5.1)
PROT SERPL-MCNC: 7.1 G/DL (ref 6–8.4)
SODIUM SERPL-SCNC: 143 MMOL/L (ref 136–145)
TSH SERPL DL<=0.005 MIU/L-ACNC: 1.04 UIU/ML (ref 0.4–4)

## 2023-11-27 PROCEDURE — 80053 COMPREHEN METABOLIC PANEL: CPT | Performed by: INTERNAL MEDICINE

## 2023-11-27 PROCEDURE — 36415 COLL VENOUS BLD VENIPUNCTURE: CPT | Mod: PN | Performed by: INTERNAL MEDICINE

## 2023-11-27 PROCEDURE — 83036 HEMOGLOBIN GLYCOSYLATED A1C: CPT | Performed by: INTERNAL MEDICINE

## 2023-11-27 PROCEDURE — 84443 ASSAY THYROID STIM HORMONE: CPT | Performed by: INTERNAL MEDICINE

## 2023-12-04 ENCOUNTER — OFFICE VISIT (OUTPATIENT)
Dept: INTERNAL MEDICINE | Facility: CLINIC | Age: 73
End: 2023-12-04
Payer: MEDICARE

## 2023-12-04 VITALS
SYSTOLIC BLOOD PRESSURE: 130 MMHG | BODY MASS INDEX: 26.35 KG/M2 | WEIGHT: 188.25 LBS | HEIGHT: 71 IN | OXYGEN SATURATION: 97 % | DIASTOLIC BLOOD PRESSURE: 74 MMHG | HEART RATE: 69 BPM

## 2023-12-04 DIAGNOSIS — E11.22 TYPE 2 DIABETES MELLITUS WITH STAGE 3A CHRONIC KIDNEY DISEASE, WITHOUT LONG-TERM CURRENT USE OF INSULIN: ICD-10-CM

## 2023-12-04 DIAGNOSIS — M10.9 GOUT, UNSPECIFIED CAUSE, UNSPECIFIED CHRONICITY, UNSPECIFIED SITE: ICD-10-CM

## 2023-12-04 DIAGNOSIS — L72.0 EPIDERMAL CYST: ICD-10-CM

## 2023-12-04 DIAGNOSIS — N18.30 STAGE 3 CHRONIC KIDNEY DISEASE, UNSPECIFIED WHETHER STAGE 3A OR 3B CKD: ICD-10-CM

## 2023-12-04 DIAGNOSIS — E04.2 MULTINODULAR NON-TOXIC GOITER: Primary | ICD-10-CM

## 2023-12-04 DIAGNOSIS — Z87.891 H/O TOBACCO USE, PRESENTING HAZARDS TO HEALTH: ICD-10-CM

## 2023-12-04 DIAGNOSIS — K63.5 POLYP OF COLON, UNSPECIFIED PART OF COLON, UNSPECIFIED TYPE: ICD-10-CM

## 2023-12-04 DIAGNOSIS — N18.31 TYPE 2 DIABETES MELLITUS WITH STAGE 3A CHRONIC KIDNEY DISEASE, WITHOUT LONG-TERM CURRENT USE OF INSULIN: ICD-10-CM

## 2023-12-04 LAB
ALBUMIN/CREAT UR: NORMAL UG/MG (ref 0–30)
CREAT UR-MCNC: 26 MG/DL (ref 23–375)
MICROALBUMIN UR DL<=1MG/L-MCNC: <5 UG/ML

## 2023-12-04 PROCEDURE — 82043 UR ALBUMIN QUANTITATIVE: CPT | Performed by: INTERNAL MEDICINE

## 2023-12-04 PROCEDURE — 99999 PR PBB SHADOW E&M-EST. PATIENT-LVL III: CPT | Mod: PBBFAC,,, | Performed by: INTERNAL MEDICINE

## 2023-12-04 PROCEDURE — 99999 PR PBB SHADOW E&M-EST. PATIENT-LVL III: ICD-10-PCS | Mod: PBBFAC,,, | Performed by: INTERNAL MEDICINE

## 2023-12-04 PROCEDURE — 99214 OFFICE O/P EST MOD 30 MIN: CPT | Mod: S$PBB,,, | Performed by: INTERNAL MEDICINE

## 2023-12-04 PROCEDURE — 99214 PR OFFICE/OUTPT VISIT, EST, LEVL IV, 30-39 MIN: ICD-10-PCS | Mod: S$PBB,,, | Performed by: INTERNAL MEDICINE

## 2023-12-04 PROCEDURE — 99213 OFFICE O/P EST LOW 20 MIN: CPT | Mod: PBBFAC | Performed by: INTERNAL MEDICINE

## 2023-12-04 RX ORDER — COLCHICINE 0.6 MG/1
CAPSULE ORAL
Qty: 20 CAPSULE | Refills: 2 | Status: SHIPPED | OUTPATIENT
Start: 2023-12-04

## 2023-12-04 NOTE — PROGRESS NOTES
Mr. Hung Sorto is a 73  y.o. male here forfollow up of medical problems.       1. DM: Patient now with HgBA1C 7.2    Amaryl 4 mg -- and   metformin to 1000  mg a day,  .   .Glucose was averaging 151  since last visit.   Lowest in 80's.   Reports eating oat meal, salad and sometimes indulging with fries. He denies polyuria, polydipsia and increased thirst. He denies any recent changes in vision, he also denies any numbness or tingling.He has lost 2  Lbs   since last time.  He has been inactive.        2. CKD: Patient has CKDIIIB, He renal function is  back to base line    Cr 1.6  with GFR 44.9 ml/min.         3. COPD: Patient has COPD with a 50 pk year smoking history. He quit in 2017. He has an albuterol inhaler which he rarely uses. He denies any wheezing, SOB or dyspnea on exertion. 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  He did his Lung screening CT  In 7/2023-- he is due back 7/24.         4. HLD: He is currently on Atorvastatin 20mg. Current LDL 49.4 , HDL 38     5. Glaucoma: Patient follows with Opthalmology frequently - every 3 months -- he has follow up in couple weeks.   He denies any vision loss or blurry vision.              He otherwise has no other complaints today.     Review of Systems:      Constitutional: no fever or chills--  He has gained 8 # since last visit.   Eyes: no visual changes  ENT: no nasal congestion or sore throat  Respiratory: no cough or shortness of breath  Cardiovascular: no chest pain or palpitations  Gastrointestinal: no nausea or vomiting, no abdominal pain or change in bowel habits  Genitourinary: no hematuria or dysuria  Musculoskeletal: had his first metatarsal joint swollen over the left foot over Thanksgiving.    Neurological: no seizures or tremors  Skin: No rashes      Review of patient's allergies indicates:  No Known Allergies     OBJECTIVE:   Vital Signs:       /74 (BP Location: Right arm, Patient Position: Sitting, BP Method:  "Medium (Manual))   Pulse 69   Ht 5' 11" (1.803 m)   Wt 85.4 kg (188 lb 4.4 oz)   SpO2 97%   BMI 26.26 kg/m²                    Physical Exam:   General:  Well developed, well nourished, no acute distress  Eyes: look a little red-- he has glaucoma and he says the eye drops cause this.    HEENT:  Normocephalic, atraumatic, PERRL, EOMI, clear sclera, throat clear without erythema or exudates  Neck: No carotid bruits. No thyromegaly.   CVS:  RRR, S1 and S2 normal, no murmurs, rubs, gallops  Resp:  Lungs clear to auscultation, no wheezes, rales, rhonchi, cough  GI:  Abdomen soft, non-tender, non-distended, normoactive bowel sounds, no masses  MSK:  No muscle atrophy, cyanosis, peripheral edema, full range of motion-- first metatarsal joint is not red r swollen    Skin:  No rashes, ulcers, erythema-- sk on left breast--   Neuro:  CNII-XII grossly intact. No gross motor/sensory deficits  Psych:  Alert and oriented to person, place, and time                  ASSESSMENT & PLAN:            Diagnoses and all orders for this visit:     Polyp of colon, unspecified part of colon, unspecified type  Colonoscopy  Due in 10/2026     Screening for cancer  -     Diabetes mellitus without complication  -    -     Recommend continue diet modification  -       -     Return to clinic in 6  months for follow up.  Monitor for low glucose.       Stage 2 moderate COPD by GOLD classification  Quit smoking- 2-3 years ago.          -     Continue Albuterol inhaler prn  CT Chest Lung Screening Low Dose - in Summer.       Stage 3 B--chronic kidney disease-- back to baseline  - stay hydrated and recheck in 6 month--         Lipid panel reviewed     He has chronically elevated total bilirubin- probably [place] disease       Discussed covid vaccine- will get in Sept and flu at same time--Will get him hte new Shingles.          Multinodular goiter--we reviewed the  repeat thyroid us from June 2023.  --      Suspect gout-- due to ckd-discussed " diet-- will give some colchicine prn

## 2023-12-11 ENCOUNTER — OFFICE VISIT (OUTPATIENT)
Dept: SURGERY | Facility: CLINIC | Age: 73
End: 2023-12-11
Payer: MEDICARE

## 2023-12-11 VITALS
HEIGHT: 71 IN | WEIGHT: 187.63 LBS | SYSTOLIC BLOOD PRESSURE: 176 MMHG | BODY MASS INDEX: 26.27 KG/M2 | DIASTOLIC BLOOD PRESSURE: 84 MMHG | HEART RATE: 68 BPM

## 2023-12-11 DIAGNOSIS — L72.0 EPIDERMAL CYST: ICD-10-CM

## 2023-12-11 PROCEDURE — 99999 PR PBB SHADOW E&M-EST. PATIENT-LVL III: ICD-10-PCS | Mod: PBBFAC,,, | Performed by: SURGERY

## 2023-12-11 PROCEDURE — 99213 OFFICE O/P EST LOW 20 MIN: CPT | Mod: S$PBB,,, | Performed by: SURGERY

## 2023-12-11 PROCEDURE — 99213 OFFICE O/P EST LOW 20 MIN: CPT | Mod: PBBFAC | Performed by: SURGERY

## 2023-12-11 PROCEDURE — 99213 PR OFFICE/OUTPT VISIT, EST, LEVL III, 20-29 MIN: ICD-10-PCS | Mod: S$PBB,,, | Performed by: SURGERY

## 2023-12-11 PROCEDURE — 99999 PR PBB SHADOW E&M-EST. PATIENT-LVL III: CPT | Mod: PBBFAC,,, | Performed by: SURGERY

## 2023-12-11 NOTE — PROGRESS NOTES
General Surgery  Initial Consult Visit    Chief Complaint: back cyst    HPI:  Patient is a 73 y.o. male, referred by Dr. Tacos Gregory Jr., for evaluation of the above.  Notes he has had several prior cutaneous cysts removed over the years, last by Dr Sylvester in 3/2022.   Over the past 3 weeks has noted another cyst that has been uncomfortable though it seems a little less bothersome over this past week. Feels there may have been some drainage early on but none recently. No fevers or chills or warmth / redness that he knows of.    Allergies:  Review of patient's allergies indicates:  No Known Allergies  Medications:  Current Outpatient Medications on File Prior to Visit   Medication Sig Dispense Refill    aspirin (ECOTRIN) 81 MG EC tablet Take 81 mg by mouth once daily.      atorvastatin (LIPITOR) 20 MG tablet TAKE 1 TABLET(20 MG) BY MOUTH EVERY DAY 90 tablet 3    cholecalciferol, vitamin D3, 125 mcg (5,000 unit) Tab Take 5,000 Units by mouth once daily.      colchicine (MITIGARE) 0.6 mg Cap 1 po qd prn gout 20 capsule 2    dorzolamide (TRUSOPT) 2 % ophthalmic solution Place 1 drop into both eyes 3 (three) times daily.       glimepiride (AMARYL) 4 MG tablet Take 1 tablet (4 mg total) by mouth before breakfast. 90 tablet 3    metFORMIN (GLUCOPHAGE) 500 MG tablet TAKE 2 TABLETS(1000 MG) BY MOUTH DAILY WITH BREAKFAST 180 tablet 0    VYZULTA 0.024 % Drop       promethazine-dextromethorphan (PROMETHAZINE-DM) 6.25-15 mg/5 mL Syrp Take 5 mLs by mouth nightly as needed (coug). 180 mL 0     Current Facility-Administered Medications on File Prior to Visit   Medication Dose Route Frequency Provider Last Rate Last Admin    albuterol sulfate nebulizer solution 2.5 mg  2.5 mg Nebulization 1 time in Clinic/HOD Yuly Justin MD         Medical History:  Past Medical History:   Diagnosis Date    Chronic kidney disease     Colon polyp     removed by colonoscopy    Glaucoma (increased eye pressure)     Goiter     MNG    Insulin  "resistance     Thyroid disease     Tobacco abuse     Vitamin D deficiency disease      Surgical History:  Past Surgical History:   Procedure Laterality Date    COLONOSCOPY N/A 10/28/2016    Procedure: COLONOSCOPY;  Surgeon: Melvin Berg MD;  Location: [facility] ([address] [place] [address]);  Service: Endoscopy;  Laterality: N/A;    COLONOSCOPY W/ BIOPSIES AND POLYPECTOMY       Family History:  Family History   Problem Relation Age of Onset    Diabetes Mother     Alzheimer's disease Mother     Stroke Father     Hypertension Father     No Known Problems Daughter     No Known Problems Daughter      Social History:  Social History     Tobacco Use    Smoking status: Former     Current packs/day: 0.00     Average packs/day: 0.5 packs/day for 50.0 years (25.0 ttl pk-yrs)     Types: Cigarettes     Start date: 1967     Quit date: 2017     Years since quittin.6     Passive exposure: Past    Smokeless tobacco: Never   Substance Use Topics    Alcohol use: Yes     Alcohol/week: 5.0 standard drinks of alcohol     Types: 4 Glasses of wine, 1 Cans of beer per week     Comment: maybe 3-4 essence or beer weekly    Drug use: No     The following portions of the patient's history were reviewed and updated as appropriate: allergies, current  medications, past family history, past medical history, past social history, past surgical history and problem list.    Review of Systems:  See HPI for pertinent positives and negatives.    Physical Exam:  BP (!) 176/84   Pulse 68   Ht 5' 11" (1.803 m)   Wt 85.1 kg (187 lb 9.8 oz)   BMI 26.17 kg/m²   Physical Exam  Right midback with a 1.5x1.0cm (roughly) cutaneous cyst without fixity. About an inch inferior to this there is a small transverse scar from a prior excision. Non-tender.    Data Review:  None pertinent    Assessment/Plan:  Diagnoses and all orders for this visit:    Epidermal cyst  -     Ambulatory referral/consult to General Surgery      Hung is a 73 y.o. male with a right back " cutaneous cyst. Desires removal.  Excision in minor procedure room next week.  Risks and benefits discussed in detail, consent obtained.    Idania Kowalski MD FACS  Minimally Invasive General & Bariatric Surgery

## 2023-12-13 ENCOUNTER — TELEPHONE (OUTPATIENT)
Dept: SURGERY | Facility: CLINIC | Age: 73
End: 2023-12-13
Payer: MEDICARE

## 2023-12-13 NOTE — TELEPHONE ENCOUNTER
"----- Message from Kelsy Savage sent at 12/13/2023  1:04 PM CST -----  Consult/Advisory:      Name Of Caller: Self    Contact Preference:   [phone]     What is the nature of the call?: pt stated that date and time no longer works  therefore would like to r/s procedure .       Additional Notes:  "Thank you for all that you do for our patients"          "

## 2024-01-08 RX ORDER — GLIMEPIRIDE 4 MG/1
4 TABLET ORAL
Qty: 90 TABLET | Refills: 1 | Status: SHIPPED | OUTPATIENT
Start: 2024-01-08

## 2024-01-25 ENCOUNTER — PROCEDURE VISIT (OUTPATIENT)
Dept: SURGERY | Facility: CLINIC | Age: 74
End: 2024-01-25
Payer: MEDICARE

## 2024-01-25 VITALS
WEIGHT: 185.63 LBS | SYSTOLIC BLOOD PRESSURE: 160 MMHG | HEIGHT: 71 IN | DIASTOLIC BLOOD PRESSURE: 75 MMHG | HEART RATE: 76 BPM | BODY MASS INDEX: 25.99 KG/M2

## 2024-01-25 DIAGNOSIS — R22.2 MASS OF SUBCUTANEOUS TISSUE OF BACK: Primary | ICD-10-CM

## 2024-01-25 PROCEDURE — 11402 EXC TR-EXT B9+MARG 1.1-2 CM: CPT | Mod: S$PBB,,, | Performed by: SURGERY

## 2024-01-25 PROCEDURE — 11402 EXC TR-EXT B9+MARG 1.1-2 CM: CPT | Mod: PBBFAC | Performed by: SURGERY

## 2024-01-25 PROCEDURE — 11400 EXC TR-EXT B9+MARG 0.5 CM<: CPT | Mod: PBBFAC

## 2024-01-25 PROCEDURE — 88307 TISSUE EXAM BY PATHOLOGIST: CPT | Mod: 26,,, | Performed by: PATHOLOGY

## 2024-01-25 PROCEDURE — 88307 TISSUE EXAM BY PATHOLOGIST: CPT | Performed by: PATHOLOGY

## 2024-01-25 NOTE — PROCEDURES
Exc, Cyst    Date/Time: 1/25/2024 11:00 AM    Performed by: Vickie Nieves MD  Authorized by: Idania Kowalski MD    Consent Done?:  Yes (Written)  Timeout: prior to procedure the correct patient, procedure, and site was verified    Prep: patient was prepped and draped in usual sterile fashion    Local anesthesia used?: Yes    Anesthesia:  Local infiltration  Local anesthetic:  Lidocaine 1% with epinephrine  Indications:  Cyst  Body area:  Back / flank  Laterality:  Right  Position:  Lateral  Anesthesia:  Local infiltration  Local anesthetic:  Lidocaine 1% with epinephrine  Excision type:  Skin  Malignancy:  Benign  Scalpel size:  15  Incision type:  Elliptical (1.5 x 1.0cm)  Specimens?: Yes     Hemostasis was obtained.  Sutures:  3-0 Vicryl and 4-0 Monocryl  Sterile dressings:  Other (comments) (dermabond)   Needle, instrument, and sponge counts were correct.   Patient tolerated the procedure well with no immediate complications.   Post-operative instructions were provided for the patient.

## 2024-01-30 LAB
FINAL PATHOLOGIC DIAGNOSIS: NORMAL
GROSS: NORMAL
Lab: NORMAL

## 2024-02-27 DIAGNOSIS — Z00.00 ENCOUNTER FOR MEDICARE ANNUAL WELLNESS EXAM: ICD-10-CM

## 2024-04-22 ENCOUNTER — OFFICE VISIT (OUTPATIENT)
Dept: INTERNAL MEDICINE | Facility: CLINIC | Age: 74
End: 2024-04-22
Payer: MEDICARE

## 2024-04-22 VITALS
WEIGHT: 182.13 LBS | BODY MASS INDEX: 25.4 KG/M2 | DIASTOLIC BLOOD PRESSURE: 84 MMHG | SYSTOLIC BLOOD PRESSURE: 124 MMHG | HEART RATE: 55 BPM | OXYGEN SATURATION: 98 %

## 2024-04-22 DIAGNOSIS — S69.91XS FINGER INJURY, RIGHT, SEQUELA: ICD-10-CM

## 2024-04-22 DIAGNOSIS — E11.22 TYPE 2 DIABETES MELLITUS WITH STAGE 3A CHRONIC KIDNEY DISEASE, WITHOUT LONG-TERM CURRENT USE OF INSULIN: ICD-10-CM

## 2024-04-22 DIAGNOSIS — N18.31 TYPE 2 DIABETES MELLITUS WITH STAGE 3A CHRONIC KIDNEY DISEASE, WITHOUT LONG-TERM CURRENT USE OF INSULIN: ICD-10-CM

## 2024-04-22 DIAGNOSIS — Z00.00 ENCOUNTER FOR PREVENTIVE HEALTH EXAMINATION: Primary | ICD-10-CM

## 2024-04-22 DIAGNOSIS — N18.31 STAGE 3A CHRONIC KIDNEY DISEASE: ICD-10-CM

## 2024-04-22 DIAGNOSIS — I70.0 AORTIC ATHEROSCLEROSIS: ICD-10-CM

## 2024-04-22 DIAGNOSIS — E21.3 HYPERPARATHYROIDISM: ICD-10-CM

## 2024-04-22 DIAGNOSIS — E04.2 MULTINODULAR NON-TOXIC GOITER: ICD-10-CM

## 2024-04-22 DIAGNOSIS — J44.9 STAGE 2 MODERATE COPD BY GOLD CLASSIFICATION: ICD-10-CM

## 2024-04-22 PROBLEM — N18.32 STAGE 3B CHRONIC KIDNEY DISEASE: Status: ACTIVE | Noted: 2024-04-22

## 2024-04-22 PROCEDURE — 99999 PR PBB SHADOW E&M-EST. PATIENT-LVL III: CPT | Mod: PBBFAC,,, | Performed by: NURSE PRACTITIONER

## 2024-04-22 PROCEDURE — 99213 OFFICE O/P EST LOW 20 MIN: CPT | Mod: PBBFAC | Performed by: NURSE PRACTITIONER

## 2024-04-22 PROCEDURE — G0439 PPPS, SUBSEQ VISIT: HCPCS | Mod: ,,, | Performed by: NURSE PRACTITIONER

## 2024-04-22 RX ORDER — ACETAMINOPHEN 500 MG
1000 TABLET ORAL EVERY 6 HOURS PRN
COMMUNITY

## 2024-04-22 NOTE — PROGRESS NOTES
Hung Sorto presented for a follow-up Medicare AWV today. The following components were reviewed and updated:    Medical history  Family History  Social history  Allergies and Current Medications  Health Risk Assessment  Health Maintenance  Care Team    **See Completed Assessments for Annual Wellness visit with in the encounter summary    The following assessments were completed:  Depression Screening  Cognitive function Screening    Timed Get Up Test  Whisper Test      Opioid documentation:      Patient does not have a current opioid prescription.          Vitals:    04/22/24 0903 04/22/24 0930   BP:  124/84   Pulse:  (!) 55   SpO2:  98%   Weight: 82.6 kg (182 lb 1.6 oz)      Body mass index is 25.4 kg/m².       Physical Exam  Vitals and nursing note reviewed.   Constitutional:       Appearance: He is well-developed.   HENT:      Head: Normocephalic.   Cardiovascular:      Rate and Rhythm: Normal rate and regular rhythm.      Heart sounds: Normal heart sounds. No murmur heard.  Pulmonary:      Effort: Pulmonary effort is normal.      Breath sounds: Normal breath sounds.   Abdominal:      General: Bowel sounds are normal.      Palpations: Abdomen is soft.   Musculoskeletal:         General: Normal range of motion.   Skin:     General: Skin is warm and dry.   Neurological:      Mental Status: He is alert and oriented to person, place, and time.      Motor: No abnormal muscle tone.   Psychiatric:         Mood and Affect: Mood normal.            Diagnoses and health risks identified today and associated recommendations/orders:    1. Encounter for preventive health examination  Here for Health Risk Assessment/Annual Wellness Visit.  Health maintenance reviewed and updated. Follow up in one year.   Prescription given for 2nd Shingrix.    2. Aortic atherosclerosis  Chronic, stable on current medications. Noted CT Chest/Lung 7/03/23. Followed by PCP.     3. Stage 3a chronic kidney disease  Chronic, stable on current  medications. Followed by PCP.    4. Stage 2 moderate COPD by GOLD classification  Chronic, stable. No C/O dyspnea/cough. Followed by PCP.    5. Type 2 diabetes mellitus with stage 3a chronic kidney disease, without long-term current use of insulin  Chronic, stable on current medications. A1c 7.2. Followed by PCP.    6. Hyperparathyroidism  Chronic, stable. Most recent lab WNL. Followed by PCP.    7. Multinodular non-toxic goiter  Chronic, stable. Followed by PCP.    8. Finger injury, right, sequela  New onset, injury to index/middle finger right hand on 3/28/24. Reports he got fingers caught in fan belt. Underwent surgery at [facility]. Now seeing outside Orthopedics and undergoing PT.       Provided Hung with a 5-10 year written screening schedule and personal prevention plan. Recommendations were developed using the USPSTF age appropriate recommendations. Education, counseling, and referrals were provided as needed.  After Visit Summary printed and given to patient which includes a list of additional screenings\tests needed.    Follow up in 6 weeks (on 6/4/2024).with PCP      Maribel Munguia NP

## 2024-04-22 NOTE — PATIENT INSTRUCTIONS
Counseling and Referral of Other Preventative  (Italic type indicates deductible and co-insurance are waived)    Patient Name: Hung Sorto  Today's Date: 4/22/2024    Health Maintenance       Date Due Completion Date    Shingles Vaccine (3 of 3) 07/28/2023 6/2/2023    Foot Exam 06/02/2024 6/2/2023    Override on 6/2/2023: Done    Override on 7/1/2022: Done    Override on 5/17/2021: Done    Override on 2/26/2019: Done    Hemoglobin A1c 05/27/2024 11/27/2023    Lipid Panel 05/29/2024 5/29/2023    LDCT Lung Screen 07/03/2024 7/3/2023    Eye Exam 11/09/2024 11/9/2023 (Done)    Override on 11/9/2023: Done    Override on 12/8/2021: Done (Dr Encinas- around [facility] --)    Override on 1/16/2019: Done (outsside)    Diabetes Urine Screening 12/04/2024 12/4/2023    Low Dose Statin 01/25/2025 1/25/2024    Colorectal Cancer Screening 10/28/2026 10/28/2016    TETANUS VACCINE 03/28/2034 3/28/2024        No orders of the defined types were placed in this encounter.      The following information is provided to all patients.  This information is to help you find resources for any of the problems found today that may be affecting your health:                  Living healthy guide: www.[facility].[place].gov      Understanding Diabetes: www.diabetes.org      Eating healthy: www.cdc.gov/healthyweight      CDC home safety checklist: www.cdc.gov/steadi/patient.html      Agency on Aging: www.goea.[place].[facility]      Alcoholics anonymous (AA): www.aa.org      Physical Activity: www.fausto.nih.gov/io5uuzl      Tobacco use: www.quitwithusla.org

## 2024-04-24 RX ORDER — METFORMIN HYDROCHLORIDE 500 MG/1
TABLET ORAL
Qty: 180 TABLET | Refills: 0 | Status: SHIPPED | OUTPATIENT
Start: 2024-04-24

## 2024-04-24 NOTE — TELEPHONE ENCOUNTER
Refill Routing Note   Medication(s) are not appropriate for processing by [facility] for the following reason(s):        Required labs abnormal    [facility] action(s):  Defer        Medication Therapy Plan: FLOS 5/28/24    Pharmacist review requested: Yes     Appointments  past 12m or future 3m with PCP    Date Provider   Last Visit   12/4/2023 Tacos Gregory Jr., MD   Next Visit   6/4/2024 Tacos Gregory Jr., MD   ED visits in past 90 days: 0        Note composed:8:49 AM 04/24/2024

## 2024-04-24 NOTE — TELEPHONE ENCOUNTER
Care Due:                  Date            Visit Type   Department     Provider  --------------------------------------------------------------------------------                                EP -                              PRIMARY      [facility] INTERNAL  Last Visit: 12-      CARE ([facility])   JOSE MARIA Gregory                              [facility]                              PRIMARY      [facility] INTERNAL  Next Visit: 06-      CARE ([facility])   [facility]       Tacos Gregory                                                            Last  Test          Frequency    Reason                     Performed    Due Date  --------------------------------------------------------------------------------    HBA1C.......  6 months...  glimepiride, metFORMIN...  11- 05-    Lipid Panel.  12 months..  atorvastatin.............  05- 05-    Uric Acid...  12 months..  colchicine...............  Not Found    Overdue    [employer] Embedded Care Due Messages. Reference number: [id number].   4/24/2024 8:06:13 AM CDT

## 2024-04-24 NOTE — TELEPHONE ENCOUNTER
Refill Decision Note   Hung Sorto  is requesting a refill authorization.  Brief Assessment and Rationale for Refill:  Approve     Medication Therapy Plan: FLOS 5/28/24; No dose adjustment recommended per renal fxn.       Pharmacist review requested: Yes   Extended chart review required: Yes   Comments:     Note composed:12:50 PM 04/24/2024

## 2024-05-01 RX ORDER — ATORVASTATIN CALCIUM 20 MG/1
20 TABLET, FILM COATED ORAL
Qty: 90 TABLET | Refills: 0 | Status: SHIPPED | OUTPATIENT
Start: 2024-05-01

## 2024-05-01 NOTE — TELEPHONE ENCOUNTER
No care due was identified.  [facility] Embedded Care Due Messages. Reference number: [id number].   5/01/2024 8:06:18 AM CDT

## 2024-05-01 NOTE — TELEPHONE ENCOUNTER
Refill Decision Note   Hung Macario  is requesting a refill authorization.  Brief Assessment and Rationale for Refill:  Approve     Medication Therapy Plan:         Comments:     Note composed:5:54 PM 05/01/2024

## 2024-05-28 ENCOUNTER — LAB VISIT (OUTPATIENT)
Dept: LAB | Facility: HOSPITAL | Age: 74
End: 2024-05-28
Attending: INTERNAL MEDICINE
Payer: MEDICARE

## 2024-05-28 DIAGNOSIS — E11.22 TYPE 2 DIABETES MELLITUS WITH STAGE 3A CHRONIC KIDNEY DISEASE, WITHOUT LONG-TERM CURRENT USE OF INSULIN: ICD-10-CM

## 2024-05-28 DIAGNOSIS — N18.31 TYPE 2 DIABETES MELLITUS WITH STAGE 3A CHRONIC KIDNEY DISEASE, WITHOUT LONG-TERM CURRENT USE OF INSULIN: ICD-10-CM

## 2024-05-28 LAB
ALBUMIN SERPL BCP-MCNC: 4.1 G/DL (ref 3.5–5.2)
ALP SERPL-CCNC: 45 U/L (ref 55–135)
ALT SERPL W/O P-5'-P-CCNC: 24 U/L (ref 10–44)
ANION GAP SERPL CALC-SCNC: 7 MMOL/L (ref 8–16)
AST SERPL-CCNC: 21 U/L (ref 10–40)
BILIRUB SERPL-MCNC: 0.8 MG/DL (ref 0.1–1)
BUN SERPL-MCNC: 21 MG/DL (ref 8–23)
CALCIUM SERPL-MCNC: 10.1 MG/DL (ref 8.7–10.5)
CHLORIDE SERPL-SCNC: 110 MMOL/L (ref 95–110)
CHOLEST SERPL-MCNC: 105 MG/DL (ref 120–199)
CHOLEST/HDLC SERPL: 2.6 {RATIO} (ref 2–5)
CO2 SERPL-SCNC: 24 MMOL/L (ref 23–29)
CREAT SERPL-MCNC: 1.7 MG/DL (ref 0.5–1.4)
EST. GFR  (NO RACE VARIABLE): 41.5 ML/MIN/1.73 M^2
ESTIMATED AVG GLUCOSE: 169 MG/DL (ref 68–131)
GLUCOSE SERPL-MCNC: 164 MG/DL (ref 70–110)
HBA1C MFR BLD: 7.5 % (ref 4–5.6)
HDLC SERPL-MCNC: 40 MG/DL (ref 40–75)
HDLC SERPL: 38.1 % (ref 20–50)
LDLC SERPL CALC-MCNC: 52.8 MG/DL (ref 63–159)
NONHDLC SERPL-MCNC: 65 MG/DL
POTASSIUM SERPL-SCNC: 4 MMOL/L (ref 3.5–5.1)
PROT SERPL-MCNC: 7.1 G/DL (ref 6–8.4)
SODIUM SERPL-SCNC: 141 MMOL/L (ref 136–145)
TRIGL SERPL-MCNC: 61 MG/DL (ref 30–150)

## 2024-05-28 PROCEDURE — 36415 COLL VENOUS BLD VENIPUNCTURE: CPT | Mod: PN | Performed by: INTERNAL MEDICINE

## 2024-05-28 PROCEDURE — 80061 LIPID PANEL: CPT | Performed by: INTERNAL MEDICINE

## 2024-05-28 PROCEDURE — 83036 HEMOGLOBIN GLYCOSYLATED A1C: CPT | Performed by: INTERNAL MEDICINE

## 2024-05-28 PROCEDURE — 80053 COMPREHEN METABOLIC PANEL: CPT | Performed by: INTERNAL MEDICINE

## 2024-06-10 ENCOUNTER — PATIENT MESSAGE (OUTPATIENT)
Dept: INTERNAL MEDICINE | Facility: CLINIC | Age: 74
End: 2024-06-10
Payer: MEDICARE

## 2024-06-20 ENCOUNTER — OFFICE VISIT (OUTPATIENT)
Dept: INTERNAL MEDICINE | Facility: CLINIC | Age: 74
End: 2024-06-20
Payer: MEDICARE

## 2024-06-20 VITALS
WEIGHT: 184.06 LBS | HEART RATE: 58 BPM | HEIGHT: 71 IN | SYSTOLIC BLOOD PRESSURE: 138 MMHG | DIASTOLIC BLOOD PRESSURE: 84 MMHG | BODY MASS INDEX: 25.77 KG/M2 | OXYGEN SATURATION: 98 %

## 2024-06-20 DIAGNOSIS — Z87.891 H/O TOBACCO USE, PRESENTING HAZARDS TO HEALTH: ICD-10-CM

## 2024-06-20 DIAGNOSIS — K63.5 POLYP OF COLON, UNSPECIFIED PART OF COLON, UNSPECIFIED TYPE: ICD-10-CM

## 2024-06-20 DIAGNOSIS — E11.22 TYPE 2 DIABETES MELLITUS WITH STAGE 3B CHRONIC KIDNEY DISEASE, WITHOUT LONG-TERM CURRENT USE OF INSULIN: Primary | ICD-10-CM

## 2024-06-20 DIAGNOSIS — N18.32 STAGE 3B CHRONIC KIDNEY DISEASE: ICD-10-CM

## 2024-06-20 DIAGNOSIS — N18.32 TYPE 2 DIABETES MELLITUS WITH STAGE 3B CHRONIC KIDNEY DISEASE, WITHOUT LONG-TERM CURRENT USE OF INSULIN: Primary | ICD-10-CM

## 2024-06-20 DIAGNOSIS — J44.9 STAGE 2 MODERATE COPD BY GOLD CLASSIFICATION: ICD-10-CM

## 2024-06-20 DIAGNOSIS — E04.2 MULTINODULAR NON-TOXIC GOITER: ICD-10-CM

## 2024-06-20 PROBLEM — N18.31 STAGE 3A CHRONIC KIDNEY DISEASE: Status: RESOLVED | Noted: 2024-04-22 | Resolved: 2024-06-20

## 2024-06-20 PROCEDURE — 99999 PR PBB SHADOW E&M-EST. PATIENT-LVL III: CPT | Mod: PBBFAC,,, | Performed by: INTERNAL MEDICINE

## 2024-06-20 PROCEDURE — 99214 OFFICE O/P EST MOD 30 MIN: CPT | Mod: S$PBB,,, | Performed by: INTERNAL MEDICINE

## 2024-06-20 PROCEDURE — 99213 OFFICE O/P EST LOW 20 MIN: CPT | Mod: PBBFAC | Performed by: INTERNAL MEDICINE

## 2024-06-20 RX ORDER — GLIMEPIRIDE 4 MG/1
4 TABLET ORAL
Qty: 90 TABLET | Refills: 1 | Status: SHIPPED | OUTPATIENT
Start: 2024-06-20

## 2024-06-20 RX ORDER — METFORMIN HYDROCHLORIDE 500 MG/1
TABLET ORAL
Qty: 180 TABLET | Refills: 3 | Status: SHIPPED | OUTPATIENT
Start: 2024-06-20

## 2024-06-20 RX ORDER — ATORVASTATIN CALCIUM 20 MG/1
20 TABLET, FILM COATED ORAL DAILY
Qty: 90 TABLET | Refills: 3 | Status: SHIPPED | OUTPATIENT
Start: 2024-06-20

## 2024-06-20 NOTE — PROGRESS NOTES
Mr. Hung Sorto is a 74  y.o. male here forfollow up of medical problems.        He had a hand injury in March 2023-  He injured his right index and middle finger.  Had surgery on index finger and and is getting PT.         1. DM: Patient now with HgBA1C 7.5     Amaryl 4 mg -- and   metformin to 1000  mg a day,  .   .Glucose was averaging 164   since last visit.   Lowest in 80's.   Reports eating oat meal, salad and sometimes indulging with fries. He denies polyuria, polydipsia and increased thirst. He denies any recent changes in vision, he also denies any numbness or tingling.He has lost 4   Lbs   since last time.  He has been inactive.        2. CKD: Patient has CKDIIIB, He renal function is  back to base line    Cr 1.7  with GFR 41.5  ml/min.    NOt spilling any albumin-  not on ace-       3. COPD: Patient has COPD with a 50 pk year smoking history. He quit in 2017. He has an albuterol inhaler which he rarely uses. He denies any wheezing, SOB or dyspnea on exertion. 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  He did his Lung screening CT  In 7/2023-- he is due back 7/24.         4. HLD: He is currently on Atorvastatin 20mg. Current LDL 52.8 , HDL 40     5. Glaucoma: Patient follows with Opthalmology frequently - every 3 months -- he has follow up in couple weeks.   He denies any vision loss or blurry vision.              He otherwise has no other complaints today.     Review of Systems:      Constitutional: no fever or chills--  He has lost 4  # since last visit.   Eyes: no visual changes  ENT: no nasal congestion or sore throat  Respiratory: no cough or shortness of breath  Cardiovascular: no chest pain or palpitations  Gastrointestinal: no nausea or vomiting, no abdominal pain or change in bowel habits  Genitourinary: no hematuria or dysuria  Musculoskeletal:  decrease rom of index ad middle finger of his right hand   Neurological: no seizures or tremors  Skin: No rashes      Review  "of patient's allergies indicates:  No Known Allergies     OBJECTIVE:   Vital Signs:          /84 (BP Location: Right arm, Patient Position: Sitting, BP Method: Medium (Manual))   Pulse (!) 58   Ht 5' 11" (1.803 m)   Wt 83.5 kg (184 lb 1.4 oz)   SpO2 98%   BMI 25.67 kg/m²           Physical Exam:   General:  Well developed, well nourished, no acute distress  Eyes: look a little red-- he has glaucoma and he says the eye drops cause this.    HEENT:  Normocephalic, atraumatic, PERRL, EOMI, clear sclera, throat clear without erythema or exudates  Neck: No carotid bruits. No thyromegaly.   CVS:  RRR, S1 and S2 normal, no murmurs, rubs, gallops  Resp:  Lungs clear to auscultation, no wheezes, rales, rhonchi, cough  GI:  Abdomen soft, non-tender, non-distended, normoactive bowel sounds, no masses  MSK:  No muscle atrophy, cyanosis, peripheral edema, full range of motion-- decrease rom of index ad middle finger of his right hand   Skin:  No rashes, ulcers, erythema-- sk on left breast--   Neuro:  CNII-XII grossly intact. No gross motor/sensory deficits  Psych:  Alert and oriented to person, place, and time      Protective Sensation (w/ 10 gram monofilament):  Right: Intact  Left: Intact    Visual Inspection:  Normal -  Bilateral    Pedal Pulses:   Right: Present  Left: Present    Posterior Tibialis Pulses:   Right:Present  Left: Present             ASSESSMENT & PLAN:       Labs - glu little worse-- this was closer to his accident -- glu  this morning was 101-- lowest 99--  WIll monitor-- Suggest not touching mower deck.  WIll get LDCT in [place]       Diagnoses and all orders for this visit:     Polyp of colon, unspecified part of colon, unspecified type  Colonoscopy  Due in 10/2026       -     Diabetes mellitus without complication      Recommend continue diet modification     Return to clinic in 6  months for follow up.  Monitor for low glucose.       Stage 2 moderate COPD by GOLD classification  Quit smoking- 2017 "         -     Continue Albuterol inhaler prn  CT Chest Lung Screening Low Dose - in JUly 2024     Stage 3 B--chronic kidney disease-- back to baseline  - stay hydrated and recheck in 6 month--         Lipid panel reviewed-- continue statin      He has chronically elevated total bilirubin- probably [place] disease                Multinodular goiter--we reviewed the  repeat thyroid us from June 2023.  --       Suspect gout-- due to ckd-discussed diet--   colchicine prn        Follow up in 6 months

## 2024-07-30 ENCOUNTER — HOSPITAL ENCOUNTER (OUTPATIENT)
Dept: RADIOLOGY | Facility: HOSPITAL | Age: 74
Discharge: HOME | End: 2024-07-30
Attending: INTERNAL MEDICINE
Payer: MEDICARE

## 2024-07-30 DIAGNOSIS — Z87.891 H/O TOBACCO USE, PRESENTING HAZARDS TO HEALTH: ICD-10-CM

## 2024-07-30 PROCEDURE — 71271 CT THORAX LUNG CANCER SCR C-: CPT | Mod: TC

## 2024-07-30 PROCEDURE — 71271 CT THORAX LUNG CANCER SCR C-: CPT | Mod: 26,,, | Performed by: STUDENT IN AN ORGANIZED HEALTH CARE EDUCATION/TRAINING PROGRAM

## 2024-09-20 RX ORDER — GLIMEPIRIDE 4 MG/1
4 TABLET ORAL
Qty: 90 TABLET | Refills: 1 | Status: SHIPPED | OUTPATIENT
Start: 2024-09-20

## 2024-09-20 NOTE — TELEPHONE ENCOUNTER
Care Due:                  Date            Visit Type   Department     Provider  --------------------------------------------------------------------------------                                EP -                              PRIMARY      [facility] INTERNAL  Last Visit: 06-      CARE ([facility])   JOSE MARIA Gregory                              [facility]                              PRIMARY      [facility] INTERNAL  Next Visit: 12-      CARE ([facility])   [facility]       Tacos Gregory                                                            Last  Test          Frequency    Reason                     Performed    Due Date  --------------------------------------------------------------------------------    HBA1C.......  6 months...  glimepiride, metFORMIN...  05- 11-    Uric Acid...  12 months..  colchicine...............  Not Found    Overdue    [employer] Embedded Care Due Messages. Reference number: [id number].   9/20/2024 12:40:44 PM CDT

## 2024-10-07 NOTE — TELEPHONE ENCOUNTER
No care due was identified.  [facility] Embedded Care Due Messages. Reference number: [id number].   10/07/2024 1:09:56 PM CDT

## 2024-10-08 RX ORDER — METFORMIN HYDROCHLORIDE 500 MG/1
TABLET ORAL
Qty: 180 TABLET | Refills: 0 | Status: SHIPPED | OUTPATIENT
Start: 2024-10-08

## 2024-12-16 ENCOUNTER — LAB VISIT (OUTPATIENT)
Dept: LAB | Facility: HOSPITAL | Age: 74
End: 2024-12-16
Attending: INTERNAL MEDICINE
Payer: MEDICARE

## 2024-12-16 DIAGNOSIS — N18.32 STAGE 3B CHRONIC KIDNEY DISEASE: ICD-10-CM

## 2024-12-16 DIAGNOSIS — N18.32 TYPE 2 DIABETES MELLITUS WITH STAGE 3B CHRONIC KIDNEY DISEASE, WITHOUT LONG-TERM CURRENT USE OF INSULIN: ICD-10-CM

## 2024-12-16 DIAGNOSIS — E11.22 TYPE 2 DIABETES MELLITUS WITH STAGE 3B CHRONIC KIDNEY DISEASE, WITHOUT LONG-TERM CURRENT USE OF INSULIN: ICD-10-CM

## 2024-12-16 LAB
ALBUMIN SERPL BCP-MCNC: 3.9 G/DL (ref 3.5–5.2)
ALP SERPL-CCNC: 45 U/L (ref 40–150)
ALT SERPL W/O P-5'-P-CCNC: 19 U/L (ref 10–44)
ANION GAP SERPL CALC-SCNC: 6 MMOL/L (ref 8–16)
AST SERPL-CCNC: 17 U/L (ref 10–40)
BASOPHILS # BLD AUTO: 0.04 K/UL (ref 0–0.2)
BASOPHILS NFR BLD: 0.7 % (ref 0–1.9)
BILIRUB SERPL-MCNC: 0.9 MG/DL (ref 0.1–1)
BUN SERPL-MCNC: 19 MG/DL (ref 8–23)
CALCIUM SERPL-MCNC: 9.5 MG/DL (ref 8.7–10.5)
CHLORIDE SERPL-SCNC: 110 MMOL/L (ref 95–110)
CO2 SERPL-SCNC: 23 MMOL/L (ref 23–29)
CREAT SERPL-MCNC: 1.6 MG/DL (ref 0.5–1.4)
DIFFERENTIAL METHOD BLD: ABNORMAL
EOSINOPHIL # BLD AUTO: 0.2 K/UL (ref 0–0.5)
EOSINOPHIL NFR BLD: 3.1 % (ref 0–8)
ERYTHROCYTE [DISTWIDTH] IN BLOOD BY AUTOMATED COUNT: 15 % (ref 11.5–14.5)
EST. GFR  (NO RACE VARIABLE): 44.7 ML/MIN/1.73 M^2
ESTIMATED AVG GLUCOSE: 177 MG/DL (ref 68–131)
GLUCOSE SERPL-MCNC: 173 MG/DL (ref 70–110)
HBA1C MFR BLD: 7.8 % (ref 4–5.6)
HCT VFR BLD AUTO: 43.7 % (ref 40–54)
HGB BLD-MCNC: 13.4 G/DL (ref 14–18)
IMM GRANULOCYTES # BLD AUTO: 0.01 K/UL (ref 0–0.04)
IMM GRANULOCYTES NFR BLD AUTO: 0.2 % (ref 0–0.5)
LYMPHOCYTES # BLD AUTO: 2.2 K/UL (ref 1–4.8)
LYMPHOCYTES NFR BLD: 38 % (ref 18–48)
MCH RBC QN AUTO: 27.7 PG (ref 27–31)
MCHC RBC AUTO-ENTMCNC: 30.7 G/DL (ref 32–36)
MCV RBC AUTO: 90 FL (ref 82–98)
MONOCYTES # BLD AUTO: 0.7 K/UL (ref 0.3–1)
MONOCYTES NFR BLD: 12.2 % (ref 4–15)
NEUTROPHILS # BLD AUTO: 2.6 K/UL (ref 1.8–7.7)
NEUTROPHILS NFR BLD: 45.8 % (ref 38–73)
NRBC BLD-RTO: 0 /100 WBC
PLATELET # BLD AUTO: 255 K/UL (ref 150–450)
PMV BLD AUTO: 10.3 FL (ref 9.2–12.9)
POTASSIUM SERPL-SCNC: 4.2 MMOL/L (ref 3.5–5.1)
PROT SERPL-MCNC: 6.9 G/DL (ref 6–8.4)
RBC # BLD AUTO: 4.84 M/UL (ref 4.6–6.2)
SODIUM SERPL-SCNC: 139 MMOL/L (ref 136–145)
WBC # BLD AUTO: 5.74 K/UL (ref 3.9–12.7)

## 2024-12-16 PROCEDURE — 80053 COMPREHEN METABOLIC PANEL: CPT | Performed by: INTERNAL MEDICINE

## 2024-12-16 PROCEDURE — 83036 HEMOGLOBIN GLYCOSYLATED A1C: CPT | Performed by: INTERNAL MEDICINE

## 2024-12-16 PROCEDURE — 85025 COMPLETE CBC W/AUTO DIFF WBC: CPT | Performed by: INTERNAL MEDICINE

## 2024-12-16 PROCEDURE — 36415 COLL VENOUS BLD VENIPUNCTURE: CPT | Mod: PN | Performed by: INTERNAL MEDICINE

## 2024-12-23 ENCOUNTER — OFFICE VISIT (OUTPATIENT)
Dept: INTERNAL MEDICINE | Facility: CLINIC | Age: 74
End: 2024-12-23
Payer: MEDICARE

## 2024-12-23 VITALS
OXYGEN SATURATION: 99 % | SYSTOLIC BLOOD PRESSURE: 138 MMHG | HEIGHT: 72 IN | BODY MASS INDEX: 25.32 KG/M2 | HEART RATE: 66 BPM | WEIGHT: 186.94 LBS | DIASTOLIC BLOOD PRESSURE: 80 MMHG

## 2024-12-23 DIAGNOSIS — E55.9 VITAMIN D DEFICIENCY: ICD-10-CM

## 2024-12-23 DIAGNOSIS — K63.5 POLYP OF COLON, UNSPECIFIED PART OF COLON, UNSPECIFIED TYPE: ICD-10-CM

## 2024-12-23 DIAGNOSIS — N18.32 STAGE 3B CHRONIC KIDNEY DISEASE: ICD-10-CM

## 2024-12-23 DIAGNOSIS — E11.22 TYPE 2 DIABETES MELLITUS WITH STAGE 3B CHRONIC KIDNEY DISEASE, WITHOUT LONG-TERM CURRENT USE OF INSULIN: ICD-10-CM

## 2024-12-23 DIAGNOSIS — J44.9 STAGE 2 MODERATE COPD BY GOLD CLASSIFICATION: Primary | ICD-10-CM

## 2024-12-23 DIAGNOSIS — E04.2 MULTINODULAR NON-TOXIC GOITER: ICD-10-CM

## 2024-12-23 DIAGNOSIS — N18.32 TYPE 2 DIABETES MELLITUS WITH STAGE 3B CHRONIC KIDNEY DISEASE, WITHOUT LONG-TERM CURRENT USE OF INSULIN: ICD-10-CM

## 2024-12-23 LAB
ALBUMIN/CREAT UR: NORMAL UG/MG (ref 0–30)
CREAT UR-MCNC: 31 MG/DL (ref 23–375)
MICROALBUMIN UR DL<=1MG/L-MCNC: <5 UG/ML

## 2024-12-23 PROCEDURE — 99214 OFFICE O/P EST MOD 30 MIN: CPT | Mod: S$PBB,,, | Performed by: INTERNAL MEDICINE

## 2024-12-23 PROCEDURE — 99213 OFFICE O/P EST LOW 20 MIN: CPT | Mod: PBBFAC | Performed by: INTERNAL MEDICINE

## 2024-12-23 PROCEDURE — G2211 COMPLEX E/M VISIT ADD ON: HCPCS | Mod: S$PBB,,, | Performed by: INTERNAL MEDICINE

## 2024-12-23 PROCEDURE — 99999 PR PBB SHADOW E&M-EST. PATIENT-LVL III: CPT | Mod: PBBFAC,,, | Performed by: INTERNAL MEDICINE

## 2024-12-23 PROCEDURE — 82570 ASSAY OF URINE CREATININE: CPT | Performed by: INTERNAL MEDICINE

## 2024-12-23 RX ORDER — METFORMIN HYDROCHLORIDE 500 MG/1
500 TABLET ORAL 3 TIMES DAILY
Qty: 270 TABLET | Refills: 3 | Status: SHIPPED | OUTPATIENT
Start: 2024-12-23

## 2024-12-23 RX ORDER — GLIMEPIRIDE 4 MG/1
4 TABLET ORAL
Qty: 90 TABLET | Refills: 2 | Status: SHIPPED | OUTPATIENT
Start: 2024-12-23

## 2024-12-23 RX ORDER — ATORVASTATIN CALCIUM 20 MG/1
20 TABLET, FILM COATED ORAL DAILY
Qty: 90 TABLET | Refills: 3 | Status: SHIPPED | OUTPATIENT
Start: 2024-12-23

## 2024-12-23 NOTE — PROGRESS NOTES
Mr. Hung Sorto is a 74  y.o. male here forfollow up of medical problems.  I last saw him in June 2024.          He had a hand injury in March 2023-  He injured his right index and middle finger.  Had surgery on index finger and and is getting PT.   Has decreased ROM on index finder of his right hand.          1. DM: Patient now with HgBA1C 7.8      Amaryl 4 mg -- and   metformin to 1000  mg a day,  .   .Glucose was averaging 177    since last visit.   Lowest in 80's.   Reports eating oat meal, salad and sometimes indulging with fries. He denies polyuria, polydipsia and increased thirst. He denies any recent changes in vision, he also denies any numbness or tingling.He has lost 4   Lbs   since last time.  He has been inactive.        2. CKD: Patient has CKDIIIB, He renal function is  back to base line    Cr 1.6 with GFR 44 ml/min.    Not spilling any albumin-  not on ace-       3. COPD: Patient has COPD with a 50 pk year smoking history. He quit in 2017. He has an albuterol inhaler which he rarely uses. He denies any wheezing, SOB or dyspnea on exertion. 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  He did his Lung screening CT  In 7/2023-- he is due back 7/24.         4. HLD: He is currently on Atorvastatin 20mg. Current LDL 52.8 , HDL 40     5. Glaucoma: Patient follows with Opthalmology frequently - every 3 months -- he has follow up in couple weeks.   He denies any vision loss or blurry vision.              He otherwise has no other complaints today.     Review of Systems:      Constitutional: no fever or chills--  He has gained 2  # since last visit.   Eyes: no visual changes  ENT: no nasal congestion or sore throat  Respiratory: no cough or shortness of breath  Cardiovascular: no chest pain or palpitations  Gastrointestinal: no nausea or vomiting, no abdominal pain or change in bowel habits  Genitourinary: no hematuria or dysuria  Musculoskeletal:  decrease rom of index ad middle  "finger of his right hand   Neurological: no seizures or tremors  Skin: No rashes      Review of patient's allergies indicates:  No Known Allergies     OBJECTIVE:   Vital Signs:      /80 (BP Location: Left arm, Patient Position: Sitting)   Pulse 66   Ht 5' 11.5" (1.816 m)   Wt 84.8 kg (186 lb 15.2 oz)   SpO2 99%   BMI 25.71 kg/m²                    Physical Exam:   General:  Well developed, well nourished, no acute distress  Eyes: look a little red-- he has glaucoma and he says the eye drops cause this.    HEENT:  Normocephalic, atraumatic, PERRL, EOMI, clear sclera, throat clear without erythema or exudates  Neck: No carotid bruits. No thyromegaly.   CVS:  RRR, S1 and S2 normal, no murmurs, rubs, gallops  Resp:  Lungs clear to auscultation, no wheezes, rales, rhonchi, cough  GI:  Abdomen soft, non-tender, non-distended, normoactive bowel sounds, no masses  MSK:  No muscle atrophy, cyanosis, peripheral edema, full range of motion-- decrease rom of index ad middle finger of his right hand   Skin:  No rashes, ulcers, erythema-- sk on left breast--   Neuro:  CNII-XII grossly intact. No gross motor/sensory deficits  Psych:  Alert and oriented to person, place, and time      Has decrease ROM of index finger in hie right hand.          ASSESSMENT & PLAN:       Labs - glu little worse-- this was closer to his accident -- glu  this morning was 101-- lowest 99--   WIll get LDCT in [place]       Diagnoses and all orders for this visit:     Polyp of colon, unspecified part of colon, unspecified type  Colonoscopy  Due in 10/2026        -     Diabetes mellitus without complication-- will increase metformin to 1500 mg-- considereGLT-2 or GLP-1       Recommend continue diet modification     Return to clinic in 6  months for follow up.  Monitor for low glucose.       Stage 2 moderate COPD by GOLD classification  Quit smoking- 2017         -     Continue Albuterol inhaler prn  CT Chest Lung Screening Low Dose - in JUly 2024   "   Stage 3 B--chronic kidney disease-- back to baseline  - stay hydrated and recheck in 6 month--         Lipid panel reviewed-- continue statin      He has chronically elevated total bilirubin- probably [place] disease                Multinodular goiter--we reviewed the  repeat thyroid us from June 2023.  --       Suspect gout-- due to ckd-discussed diet--   colchicine prn          Follow up in 6 months      Our office providers are the focal point for all needed health care services that are part of ongoing care related to a patient's single serious condition or the patient's complex conditions.

## 2025-03-24 DIAGNOSIS — Z00.00 ENCOUNTER FOR MEDICARE ANNUAL WELLNESS EXAM: ICD-10-CM

## 2025-06-09 LAB
LEFT EYE DM RETINOPATHY: NEGATIVE
RIGHT EYE DM RETINOPATHY: NEGATIVE

## 2025-06-11 ENCOUNTER — PATIENT OUTREACH (OUTPATIENT)
Dept: ADMINISTRATIVE | Facility: HOSPITAL | Age: 75
End: 2025-06-11
Payer: MEDICARE

## 2025-06-11 ENCOUNTER — OFFICE VISIT (OUTPATIENT)
Dept: INTERNAL MEDICINE | Facility: CLINIC | Age: 75
End: 2025-06-11
Payer: MEDICARE

## 2025-06-11 VITALS
OXYGEN SATURATION: 99 % | HEART RATE: 78 BPM | DIASTOLIC BLOOD PRESSURE: 72 MMHG | HEIGHT: 72 IN | WEIGHT: 184.06 LBS | SYSTOLIC BLOOD PRESSURE: 136 MMHG | BODY MASS INDEX: 24.93 KG/M2

## 2025-06-11 DIAGNOSIS — E21.3 HYPERPARATHYROIDISM: ICD-10-CM

## 2025-06-11 DIAGNOSIS — N18.32 TYPE 2 DIABETES MELLITUS WITH STAGE 3B CHRONIC KIDNEY DISEASE, WITHOUT LONG-TERM CURRENT USE OF INSULIN: ICD-10-CM

## 2025-06-11 DIAGNOSIS — H40.1134 PRIMARY OPEN ANGLE GLAUCOMA (POAG) OF BOTH EYES, INDETERMINATE STAGE: ICD-10-CM

## 2025-06-11 DIAGNOSIS — Z87.891 H/O TOBACCO USE, PRESENTING HAZARDS TO HEALTH: ICD-10-CM

## 2025-06-11 DIAGNOSIS — E11.22 TYPE 2 DIABETES MELLITUS WITH STAGE 3B CHRONIC KIDNEY DISEASE, WITHOUT LONG-TERM CURRENT USE OF INSULIN: ICD-10-CM

## 2025-06-11 DIAGNOSIS — N18.31 TYPE 2 DIABETES MELLITUS WITH STAGE 3A CHRONIC KIDNEY DISEASE, WITHOUT LONG-TERM CURRENT USE OF INSULIN: ICD-10-CM

## 2025-06-11 DIAGNOSIS — I70.0 AORTIC ATHEROSCLEROSIS: ICD-10-CM

## 2025-06-11 DIAGNOSIS — N18.32 STAGE 3B CHRONIC KIDNEY DISEASE: ICD-10-CM

## 2025-06-11 DIAGNOSIS — E11.22 TYPE 2 DIABETES MELLITUS WITH STAGE 3A CHRONIC KIDNEY DISEASE, WITHOUT LONG-TERM CURRENT USE OF INSULIN: ICD-10-CM

## 2025-06-11 DIAGNOSIS — E04.2 MULTINODULAR NON-TOXIC GOITER: ICD-10-CM

## 2025-06-11 DIAGNOSIS — J44.9 STAGE 2 MODERATE COPD BY GOLD CLASSIFICATION: ICD-10-CM

## 2025-06-11 DIAGNOSIS — Z00.00 ENCOUNTER FOR MEDICARE ANNUAL WELLNESS EXAM: Primary | ICD-10-CM

## 2025-06-11 PROCEDURE — 99999 PR PBB SHADOW E&M-EST. PATIENT-LVL IV: CPT | Mod: PBBFAC,,, | Performed by: NURSE PRACTITIONER

## 2025-06-11 PROCEDURE — 99214 OFFICE O/P EST MOD 30 MIN: CPT | Mod: PBBFAC | Performed by: NURSE PRACTITIONER

## 2025-06-11 RX ORDER — LATANOPROST 50 UG/ML
1 SOLUTION/ DROPS OPHTHALMIC NIGHTLY
COMMUNITY

## 2025-06-11 NOTE — PROGRESS NOTES
Chart reviewed and updated. Reconciled immunizations. Imported outside eye exam record.     Melanie Mix LPN   Clinical Care Coordinator  Primary Care and Wellness

## 2025-06-11 NOTE — PROGRESS NOTES
"  Hung Sorto presented for a follow-up Medicare AWV today. The following components were reviewed and updated:    Medical history  Family History  Social history  Allergies and Current Medications  Health Risk Assessment  Health Maintenance  Care Team    **See Completed Assessments for Annual Wellness visit with in the encounter summary    The following assessments were completed:  Depression Screening  Cognitive function Screening    Timed Get Up Test  Whisper Test      Opioid documentation:      Patient does not have a current opioid prescription.          Vitals:    06/11/25 0842   BP: 136/72   BP Location: Right arm   Patient Position: Sitting   Pulse: 78   SpO2: 99%   Weight: 83.5 kg (184 lb 1.4 oz)   Height: 5' 11.5" (1.816 m)     Body mass index is 25.32 kg/m².       Physical Exam  Vitals reviewed.   Constitutional:       Appearance: He is well-developed.   HENT:      Head: Normocephalic.      Right Ear: External ear normal.      Left Ear: External ear normal.      Nose: Nose normal.      Mouth/Throat:      Pharynx: No oropharyngeal exudate.   Eyes:      Pupils: Pupils are equal, round, and reactive to light.   Neck:      Thyroid: No thyromegaly.      Vascular: No JVD.      Trachea: No tracheal deviation.   Cardiovascular:      Rate and Rhythm: Normal rate and regular rhythm.      Heart sounds: No murmur heard.     No friction rub. No gallop.   Pulmonary:      Effort: No respiratory distress.      Breath sounds: Normal breath sounds. No wheezing or rales.   Chest:      Chest wall: No tenderness.   Abdominal:      General: Bowel sounds are normal. There is no distension.      Palpations: Abdomen is soft.      Tenderness: There is no abdominal tenderness.   Musculoskeletal:         General: No tenderness. Normal range of motion.   Lymphadenopathy:      Cervical: No cervical adenopathy.   Skin:     General: Skin is warm and dry.      Findings: No rash.   Neurological:      Mental Status: He is alert and " oriented to person, place, and time.   Psychiatric:         Behavior: Behavior normal.            Diagnoses and health risks identified today and associated recommendations/orders:  1. Encounter for Medicare annual wellness exam  All age and gender related screenings discussed   - Referral to Enhanced Annual Wellness Visit (eAWV) M+1    2. Stage 2 moderate COPD by GOLD classification  Stable. Will cont tobacco cessation.     3. Aortic atherosclerosis  Stable. Will cont lipitor and monitor FLP     4. Stage 3b chronic kidney disease  Stable. Will cont Bp control and CBG. Monitor A1c.     5. Hyperparathyroidism  Stable. Will monitor calcium level and PTH     6. Multinodular non-toxic goiter  Stable. Will monitor and f/u with PCP     7. Type 2 diabetes mellitus with stage 3b chronic kidney disease, without long-term current use of insulin  Stable. Will cont metformin and glimepiride and low sugar diet. Monitor A1c     8. H/O tobacco use, presenting hazards to health  Stable. Will cont tobacco cessation     9. Primary open angle glaucoma (POAG) of both eyes, indeterminate stage  Stable. Will cont latanoprost and trusopt and f/u with ophthalmology     10. Type 2 diabetes mellitus with stage 3a chronic kidney disease, without long-term current use of insulin  Stable. Will cont metformin and glimepiride and low sugar diet. Monitor A1c       Provided Hung with a 5-10 year written screening schedule and personal prevention plan. Recommendations were developed using the USPSTF age appropriate recommendations. Education, counseling, and referrals were provided as needed.  After Visit Summary printed and given to patient which includes a list of additional screenings\tests needed.    Post Discharge Follow-up Today:   Future Appointments:  Future Appointments   Date Time Provider Department [place]   6/16/2025  9:00 AM [facility]   6/23/2025  9:00 AM Tacos Gregory Jr., MD [facility]          I offered to discuss advanced care planning, including how to pick a person who would make decisions for you if you were unable to make them for yourself, called a health care power of , and what kind of decisions you might make such as use of life sustaining treatments such as ventilators and tube feeding when faced with a life limiting illness recorded on a living will that they will need to know. (How you want to be cared for as you near the end of your natural life)     X Patient is interested in learning more about how to make advanced directives.  I provided them paperwork and offered to discuss this with them.    Sunshine REDD, APRN, FNP-c  Nurse Practitioner   Internal Medicine   [address] [facility] [address]  [phone]

## 2025-06-11 NOTE — PATIENT INSTRUCTIONS
Counseling and Referral of Other Preventative  (Italic type indicates deductible and co-insurance are waived)    Patient Name: Hung Sorto  Today's Date: 6/11/2025    Health Maintenance       Date Due Completion Date    Diabetic Eye Exam 11/09/2024 11/9/2023 (Done)    Override on 11/9/2023: Done    Override on 12/8/2021: Done (Dr Encinas- around turo --)    Override on 1/16/2019: Done (outsside)    Lipid Panel 05/28/2025 5/28/2024    Hemoglobin A1c 06/16/2025 12/16/2024    LDCT Lung Screen 07/30/2025 7/30/2024    Diabetes Urine Screening 12/23/2025 12/23/2024    TETANUS VACCINE 03/28/2034 3/28/2024        No orders of the defined types were placed in this encounter.      The following information is provided to all patients.  This information is to help you find resources for any of the problems found today that may be affecting your health:                  Living healthy guide: www.[facility].[place].gov      Understanding Diabetes: www.diabetes.org      Eating healthy: www.cdc.gov/healthyweight      CDC home safety checklist: www.cdc.gov/steadi/patient.html      Agency on Aging: www.goea.[place].gov      Alcoholics anonymous (AA): www.aa.org      Physical Activity: www.fausto.nih.gov/md9naue      Tobacco use: www.quitwithusla.org

## 2025-06-16 ENCOUNTER — LAB VISIT (OUTPATIENT)
Dept: LAB | Facility: HOSPITAL | Age: 75
End: 2025-06-16
Attending: INTERNAL MEDICINE
Payer: MEDICARE

## 2025-06-16 DIAGNOSIS — E04.2 MULTINODULAR NON-TOXIC GOITER: ICD-10-CM

## 2025-06-16 DIAGNOSIS — E11.22 TYPE 2 DIABETES MELLITUS WITH STAGE 3B CHRONIC KIDNEY DISEASE, WITHOUT LONG-TERM CURRENT USE OF INSULIN: ICD-10-CM

## 2025-06-16 DIAGNOSIS — N18.32 TYPE 2 DIABETES MELLITUS WITH STAGE 3B CHRONIC KIDNEY DISEASE, WITHOUT LONG-TERM CURRENT USE OF INSULIN: ICD-10-CM

## 2025-06-16 DIAGNOSIS — E55.9 VITAMIN D DEFICIENCY: ICD-10-CM

## 2025-06-16 LAB
ALBUMIN SERPL BCP-MCNC: 4.3 G/DL (ref 3.5–5.2)
ALP SERPL-CCNC: 43 UNIT/L (ref 40–150)
ALT SERPL W/O P-5'-P-CCNC: 17 UNIT/L (ref 10–44)
ANION GAP (OHS): 8 MMOL/L (ref 8–16)
AST SERPL-CCNC: 15 UNIT/L (ref 11–45)
BILIRUB SERPL-MCNC: 0.8 MG/DL (ref 0.1–1)
BUN SERPL-MCNC: 26 MG/DL (ref 8–23)
CALCIUM SERPL-MCNC: 9.7 MG/DL (ref 8.7–10.5)
CHLORIDE SERPL-SCNC: 107 MMOL/L (ref 95–110)
CHOLEST SERPL-MCNC: 119 MG/DL (ref 120–199)
CHOLEST/HDLC SERPL: 3.3 {RATIO} (ref 2–5)
CO2 SERPL-SCNC: 22 MMOL/L (ref 23–29)
CREAT SERPL-MCNC: 1.4 MG/DL (ref 0.5–1.4)
EAG (OHS): 171 MG/DL (ref 68–131)
GFR SERPLBLD CREATININE-BSD FMLA CKD-EPI: 52 ML/MIN/1.73/M2
GLUCOSE SERPL-MCNC: 161 MG/DL (ref 70–110)
HBA1C MFR BLD: 7.6 % (ref 4–5.6)
HDLC SERPL-MCNC: 36 MG/DL (ref 40–75)
HDLC SERPL: 30.3 % (ref 20–50)
LDLC SERPL CALC-MCNC: 68 MG/DL (ref 63–159)
NONHDLC SERPL-MCNC: 83 MG/DL
POTASSIUM SERPL-SCNC: 4.4 MMOL/L (ref 3.5–5.1)
PROT SERPL-MCNC: 6.9 GM/DL (ref 6–8.4)
SODIUM SERPL-SCNC: 137 MMOL/L (ref 136–145)
TRIGL SERPL-MCNC: 75 MG/DL (ref 30–150)
TSH SERPL-ACNC: 0.97 UIU/ML (ref 0.4–4)

## 2025-06-16 PROCEDURE — 84443 ASSAY THYROID STIM HORMONE: CPT

## 2025-06-16 PROCEDURE — 82652 VIT D 1 25-DIHYDROXY: CPT

## 2025-06-16 PROCEDURE — 82040 ASSAY OF SERUM ALBUMIN: CPT

## 2025-06-16 PROCEDURE — 36415 COLL VENOUS BLD VENIPUNCTURE: CPT | Mod: PN

## 2025-06-16 PROCEDURE — 82465 ASSAY BLD/SERUM CHOLESTEROL: CPT

## 2025-06-16 PROCEDURE — 83036 HEMOGLOBIN GLYCOSYLATED A1C: CPT

## 2025-06-19 LAB — W VITAMIN D, 1, 25-DIHYDROXY: 30 PG/ML

## 2025-06-23 ENCOUNTER — OFFICE VISIT (OUTPATIENT)
Dept: INTERNAL MEDICINE | Facility: CLINIC | Age: 75
End: 2025-06-23
Payer: MEDICARE

## 2025-06-23 ENCOUNTER — PATIENT MESSAGE (OUTPATIENT)
Dept: INTERNAL MEDICINE | Facility: CLINIC | Age: 75
End: 2025-06-23

## 2025-06-23 VITALS
HEART RATE: 69 BPM | HEIGHT: 71 IN | WEIGHT: 184.5 LBS | OXYGEN SATURATION: 97 % | BODY MASS INDEX: 25.83 KG/M2 | SYSTOLIC BLOOD PRESSURE: 134 MMHG | DIASTOLIC BLOOD PRESSURE: 76 MMHG

## 2025-06-23 DIAGNOSIS — Z87.891 H/O TOBACCO USE, PRESENTING HAZARDS TO HEALTH: ICD-10-CM

## 2025-06-23 DIAGNOSIS — K63.5 POLYP OF COLON, UNSPECIFIED PART OF COLON, UNSPECIFIED TYPE: Primary | ICD-10-CM

## 2025-06-23 DIAGNOSIS — E11.22 TYPE 2 DIABETES MELLITUS WITH STAGE 3B CHRONIC KIDNEY DISEASE, WITHOUT LONG-TERM CURRENT USE OF INSULIN: ICD-10-CM

## 2025-06-23 DIAGNOSIS — E04.2 MULTINODULAR NON-TOXIC GOITER: ICD-10-CM

## 2025-06-23 DIAGNOSIS — N18.32 TYPE 2 DIABETES MELLITUS WITH STAGE 3B CHRONIC KIDNEY DISEASE, WITHOUT LONG-TERM CURRENT USE OF INSULIN: ICD-10-CM

## 2025-06-23 DIAGNOSIS — Z87.891 PERSONAL HISTORY OF NICOTINE DEPENDENCE: ICD-10-CM

## 2025-06-23 DIAGNOSIS — N18.32 STAGE 3B CHRONIC KIDNEY DISEASE: ICD-10-CM

## 2025-06-23 PROCEDURE — 99999 PR PBB SHADOW E&M-EST. PATIENT-LVL III: CPT | Mod: PBBFAC,,, | Performed by: INTERNAL MEDICINE

## 2025-06-23 PROCEDURE — G2211 COMPLEX E/M VISIT ADD ON: HCPCS | Mod: ,,, | Performed by: INTERNAL MEDICINE

## 2025-06-23 PROCEDURE — 99214 OFFICE O/P EST MOD 30 MIN: CPT | Mod: S$PBB,,, | Performed by: INTERNAL MEDICINE

## 2025-06-23 PROCEDURE — 99213 OFFICE O/P EST LOW 20 MIN: CPT | Mod: PBBFAC | Performed by: INTERNAL MEDICINE

## 2025-06-23 NOTE — PROGRESS NOTES
Mr. Hung Sorto is a 75  y.o. male here forfollow up of medical problems.  I last saw him in December 2024.          He had a hand injury in March 2023-  He injured his right index and middle finger.  Had surgery on index finger and and is getting PT.   Has decreased ROM on index finder of his right hand.          1. DM: Patient now with HgBA1C 7.6     Amaryl 4 mg -- and   metformin to 1000  mg a day,  .   .Glucose was averaging 171    since last visit.   Lowest in 80's.   Reports eating oat meal, salad and sometimes indulging with fries. He denies polyuria, polydipsia and increased thirst. He denies any recent changes in vision, he also denies any numbness or tingling.He has lost 4   Lbs   since last time.  He has been inactive.        2. CKD: Patient has CKDIIIB, He renal function is  back to base line    Cr 1.4 with GFR 52 ml/min.    Not spilling any albumin-  not on ace-       3. COPD: Patient has COPD with a 50 pk year smoking history. He quit in 2017. He has an albuterol inhaler which he rarely uses. He denies any wheezing, SOB or dyspnea on exertion. 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  He did his Lung screening CT  In 7/2023-- he is due back 7/24.         4. HLD: He is currently on Atorvastatin 20mg. Current LDL 68.0  , HDL 36     5. Glaucoma: Patient follows with Opthalmology frequently - every 3 months -- he had a follow up earlier this month             He otherwise has no other complaints today.     Review of Systems:      Constitutional: no fever or chills--  He has lost 2  # since last visit.   Eyes: no visual changes  ENT: no nasal congestion or sore throat  Respiratory: no cough or shortness of breath  Cardiovascular: no chest pain or palpitations  Gastrointestinal: no nausea or vomiting, no abdominal pain or change in bowel habits  Genitourinary: no hematuria or dysuria  Musculoskeletal:  decrease rom of index ad middle finger of his right hand   Neurological: no  "seizures or tremors  Skin: No rashes      Review of patient's allergies indicates:  No Known Allergies     OBJECTIVE:   Vital Signs:          /76 (BP Location: Left arm, Patient Position: Sitting)   Pulse 69   Ht 5' 11" (1.803 m)   Wt 83.7 kg (184 lb 8.4 oz)   SpO2 97%   BMI 25.74 kg/m²                       Physical Exam:   General:  Well developed, well nourished, no acute distress  Eyes: look a little red-- he has glaucoma and he says the eye drops cause this.    HEENT:  Normocephalic, atraumatic, PERRL, EOMI, clear sclera, throat clear without erythema or exudates  Neck: No carotid bruits. No thyromegaly.   CVS:  RRR, S1 and S2 normal, no murmurs, rubs, gallops  Resp:  Lungs clear to auscultation, no wheezes, rales, rhonchi, cough  GI:  Abdomen soft, non-tender, non-distended, normoactive bowel sounds, no masses  MSK:  No muscle atrophy, cyanosis, peripheral edema, full range of motion-- decrease rom of index ad middle finger of his right hand   Skin:  No rashes, ulcers, erythema-- sk on left breast--   Neuro:  CNII-XII grossly intact. No gross motor/sensory deficits  Psych:  Alert and oriented to person, place, and time      Has decrease ROM of index finger in hie right hand.          ASSESSMENT & PLAN:       Labs - reviewed-      Due for LDCT      Diagnoses and all orders for this visit:     Polyp of colon, unspecified part of colon, unspecified type  Colonoscopy  Due in 10/2026        -     Diabetes mellitus without complication-- will metformin to 1500 mg-- consider GLT-2-- will start Jardiance-- stop the glimepiride when he starts this.        Recommend continue diet modification-       Return to clinic in 4  months for follow up.  Monitor for low glucose.       Stage 2 moderate COPD by GOLD classification  Quit smoking- 2017         -     Continue Albuterol inhaler prn  CT Chest Lung Screening Low Dose - in [place] 2025     Stage 3 B--chronic kidney disease-- back to baseline  - stay hydrated and " recheck in 6 month--         Lipid panel reviewed-- continue statin      He has chronically elevated total bilirubin- probably [place] disease                Multinodular goiter--we reviewed the  repeat thyroid us from June 2023.  --       Suspect gout-- due to ckd-discussed diet--   colchicine prn          Follow up in 4 months       Our office providers are the focal point for all needed health care services that are part of ongoing care related to a patient's single serious condition or the patient's complex conditions.

## 2025-06-27 ENCOUNTER — HOSPITAL ENCOUNTER (OUTPATIENT)
Dept: RADIOLOGY | Facility: HOSPITAL | Age: 75
Discharge: HOME | End: 2025-06-27
Attending: INTERNAL MEDICINE
Payer: MEDICARE

## 2025-06-27 DIAGNOSIS — N18.32 STAGE 3B CHRONIC KIDNEY DISEASE: ICD-10-CM

## 2025-06-27 DIAGNOSIS — N18.32 TYPE 2 DIABETES MELLITUS WITH STAGE 3B CHRONIC KIDNEY DISEASE, WITHOUT LONG-TERM CURRENT USE OF INSULIN: ICD-10-CM

## 2025-06-27 DIAGNOSIS — Z87.891 PERSONAL HISTORY OF NICOTINE DEPENDENCE: ICD-10-CM

## 2025-06-27 DIAGNOSIS — E11.22 TYPE 2 DIABETES MELLITUS WITH STAGE 3B CHRONIC KIDNEY DISEASE, WITHOUT LONG-TERM CURRENT USE OF INSULIN: ICD-10-CM

## 2025-06-27 PROCEDURE — 71271 CT THORAX LUNG CANCER SCR C-: CPT | Mod: 26,,, | Performed by: RADIOLOGY

## 2025-06-27 PROCEDURE — 71271 CT THORAX LUNG CANCER SCR C-: CPT | Mod: TC

## 2025-06-30 RX ORDER — GLIMEPIRIDE 4 MG/1
4 TABLET ORAL
Qty: 90 TABLET | Refills: 2 | Status: SHIPPED | OUTPATIENT
Start: 2025-06-30

## 2025-08-26 RX ORDER — ATORVASTATIN CALCIUM 20 MG/1
20 TABLET, FILM COATED ORAL DAILY
Qty: 90 TABLET | Refills: 3 | Status: SHIPPED | OUTPATIENT
Start: 2025-08-26